# Patient Record
Sex: FEMALE | Race: ASIAN | Employment: FULL TIME | URBAN - METROPOLITAN AREA
[De-identification: names, ages, dates, MRNs, and addresses within clinical notes are randomized per-mention and may not be internally consistent; named-entity substitution may affect disease eponyms.]

---

## 2018-07-30 ENCOUNTER — HOSPITAL ENCOUNTER (OUTPATIENT)
Age: 43
Setting detail: OBSERVATION
Discharge: HOME | End: 2018-07-31
Attending: EMERGENCY MEDICINE | Admitting: SURGERY
Payer: COMMERCIAL

## 2018-07-30 ENCOUNTER — APPOINTMENT (OUTPATIENT)
Dept: CT IMAGING | Age: 43
End: 2018-07-30
Attending: PHYSICIAN ASSISTANT
Payer: COMMERCIAL

## 2018-07-30 ENCOUNTER — APPOINTMENT (OUTPATIENT)
Dept: ULTRASOUND IMAGING | Age: 43
End: 2018-07-30
Attending: PHYSICIAN ASSISTANT
Payer: COMMERCIAL

## 2018-07-30 ENCOUNTER — ANESTHESIA EVENT (OUTPATIENT)
Dept: SURGERY | Age: 43
End: 2018-07-30
Payer: COMMERCIAL

## 2018-07-30 DIAGNOSIS — K80.00 ACUTE CALCULOUS CHOLECYSTITIS: ICD-10-CM

## 2018-07-30 DIAGNOSIS — K80.20 SYMPTOMATIC CHOLELITHIASIS: Primary | ICD-10-CM

## 2018-07-30 DIAGNOSIS — Z90.49 S/P LAPAROSCOPIC CHOLECYSTECTOMY: ICD-10-CM

## 2018-07-30 LAB
ALBUMIN SERPL-MCNC: 3.7 G/DL (ref 3.5–5)
ALBUMIN/GLOB SERPL: 0.9 {RATIO} (ref 1.1–2.2)
ALP SERPL-CCNC: 80 U/L (ref 45–117)
ALT SERPL-CCNC: 102 U/L (ref 12–78)
ANION GAP SERPL CALC-SCNC: 10 MMOL/L (ref 5–15)
APPEARANCE UR: CLEAR
AST SERPL-CCNC: 193 U/L (ref 15–37)
ATRIAL RATE: 61 BPM
BACTERIA URNS QL MICRO: NEGATIVE /HPF
BASOPHILS # BLD: 0.1 K/UL (ref 0–0.1)
BASOPHILS NFR BLD: 1 % (ref 0–1)
BILIRUB SERPL-MCNC: 1.2 MG/DL (ref 0.2–1)
BILIRUB UR QL: NEGATIVE
BUN SERPL-MCNC: 6 MG/DL (ref 6–20)
BUN/CREAT SERPL: 7 (ref 12–20)
CALCIUM SERPL-MCNC: 8.3 MG/DL (ref 8.5–10.1)
CALCULATED P AXIS, ECG09: -13 DEGREES
CALCULATED R AXIS, ECG10: 80 DEGREES
CALCULATED T AXIS, ECG11: 3 DEGREES
CHLORIDE SERPL-SCNC: 102 MMOL/L (ref 97–108)
CO2 SERPL-SCNC: 26 MMOL/L (ref 21–32)
COLOR UR: NORMAL
CREAT SERPL-MCNC: 0.85 MG/DL (ref 0.55–1.02)
D DIMER PPP FEU-MCNC: 0.66 MG/L FEU (ref 0–0.65)
DIAGNOSIS, 93000: NORMAL
DIFFERENTIAL METHOD BLD: ABNORMAL
EOSINOPHIL # BLD: 0.2 K/UL (ref 0–0.4)
EOSINOPHIL NFR BLD: 2 % (ref 0–7)
EPITH CASTS URNS QL MICRO: NORMAL /LPF
ERYTHROCYTE [DISTWIDTH] IN BLOOD BY AUTOMATED COUNT: 15 % (ref 11.5–14.5)
GLOBULIN SER CALC-MCNC: 4.3 G/DL (ref 2–4)
GLUCOSE SERPL-MCNC: 101 MG/DL (ref 65–100)
GLUCOSE UR STRIP.AUTO-MCNC: NEGATIVE MG/DL
HCG UR QL: NEGATIVE
HCT VFR BLD AUTO: 37.6 % (ref 35–47)
HGB BLD-MCNC: 12.8 G/DL (ref 11.5–16)
HGB UR QL STRIP: NEGATIVE
HYALINE CASTS URNS QL MICRO: NORMAL /LPF (ref 0–5)
IMM GRANULOCYTES # BLD: 0 K/UL (ref 0–0.04)
IMM GRANULOCYTES NFR BLD AUTO: 0 % (ref 0–0.5)
KETONES UR QL STRIP.AUTO: NEGATIVE MG/DL
LEUKOCYTE ESTERASE UR QL STRIP.AUTO: NEGATIVE
LIPASE SERPL-CCNC: 181 U/L (ref 73–393)
LYMPHOCYTES # BLD: 2.1 K/UL (ref 0.8–3.5)
LYMPHOCYTES NFR BLD: 20 % (ref 12–49)
MCH RBC QN AUTO: 28.1 PG (ref 26–34)
MCHC RBC AUTO-ENTMCNC: 34 G/DL (ref 30–36.5)
MCV RBC AUTO: 82.6 FL (ref 80–99)
MONOCYTES # BLD: 0.7 K/UL (ref 0–1)
MONOCYTES NFR BLD: 6 % (ref 5–13)
NEUTS SEG # BLD: 7.1 K/UL (ref 1.8–8)
NEUTS SEG NFR BLD: 70 % (ref 32–75)
NITRITE UR QL STRIP.AUTO: NEGATIVE
NRBC # BLD: 0 K/UL (ref 0–0.01)
NRBC BLD-RTO: 0 PER 100 WBC
P-R INTERVAL, ECG05: 134 MS
PH UR STRIP: 6.5 [PH] (ref 5–8)
PLATELET # BLD AUTO: 404 K/UL (ref 150–400)
PMV BLD AUTO: 8.7 FL (ref 8.9–12.9)
POTASSIUM SERPL-SCNC: 4.1 MMOL/L (ref 3.5–5.1)
PROT SERPL-MCNC: 8 G/DL (ref 6.4–8.2)
PROT UR STRIP-MCNC: NEGATIVE MG/DL
Q-T INTERVAL, ECG07: 454 MS
QRS DURATION, ECG06: 86 MS
QTC CALCULATION (BEZET), ECG08: 457 MS
RBC # BLD AUTO: 4.55 M/UL (ref 3.8–5.2)
RBC #/AREA URNS HPF: NORMAL /HPF (ref 0–5)
SODIUM SERPL-SCNC: 138 MMOL/L (ref 136–145)
SP GR UR REFRACTOMETRY: 1.02 (ref 1–1.03)
TROPONIN I SERPL-MCNC: <0.05 NG/ML
UA: UC IF INDICATED,UAUC: NORMAL
UROBILINOGEN UR QL STRIP.AUTO: 0.2 EU/DL (ref 0.2–1)
VENTRICULAR RATE, ECG03: 61 BPM
WBC # BLD AUTO: 10.1 K/UL (ref 3.6–11)
WBC URNS QL MICRO: NORMAL /HPF (ref 0–4)

## 2018-07-30 PROCEDURE — 93005 ELECTROCARDIOGRAM TRACING: CPT

## 2018-07-30 PROCEDURE — 85379 FIBRIN DEGRADATION QUANT: CPT | Performed by: PHYSICIAN ASSISTANT

## 2018-07-30 PROCEDURE — 81001 URINALYSIS AUTO W/SCOPE: CPT | Performed by: EMERGENCY MEDICINE

## 2018-07-30 PROCEDURE — 84484 ASSAY OF TROPONIN QUANT: CPT | Performed by: PHYSICIAN ASSISTANT

## 2018-07-30 PROCEDURE — 74011250636 HC RX REV CODE- 250/636: Performed by: SURGERY

## 2018-07-30 PROCEDURE — 74011636320 HC RX REV CODE- 636/320: Performed by: EMERGENCY MEDICINE

## 2018-07-30 PROCEDURE — 81025 URINE PREGNANCY TEST: CPT

## 2018-07-30 PROCEDURE — 85025 COMPLETE CBC W/AUTO DIFF WBC: CPT | Performed by: EMERGENCY MEDICINE

## 2018-07-30 PROCEDURE — 96375 TX/PRO/DX INJ NEW DRUG ADDON: CPT

## 2018-07-30 PROCEDURE — 96365 THER/PROPH/DIAG IV INF INIT: CPT

## 2018-07-30 PROCEDURE — 74011250636 HC RX REV CODE- 250/636: Performed by: PHYSICIAN ASSISTANT

## 2018-07-30 PROCEDURE — 96374 THER/PROPH/DIAG INJ IV PUSH: CPT

## 2018-07-30 PROCEDURE — 71275 CT ANGIOGRAPHY CHEST: CPT

## 2018-07-30 PROCEDURE — 74011000258 HC RX REV CODE- 258: Performed by: SURGERY

## 2018-07-30 PROCEDURE — 76705 ECHO EXAM OF ABDOMEN: CPT

## 2018-07-30 PROCEDURE — 96366 THER/PROPH/DIAG IV INF ADDON: CPT

## 2018-07-30 PROCEDURE — 36415 COLL VENOUS BLD VENIPUNCTURE: CPT | Performed by: PHYSICIAN ASSISTANT

## 2018-07-30 PROCEDURE — 96361 HYDRATE IV INFUSION ADD-ON: CPT

## 2018-07-30 PROCEDURE — 83690 ASSAY OF LIPASE: CPT | Performed by: EMERGENCY MEDICINE

## 2018-07-30 PROCEDURE — 99285 EMERGENCY DEPT VISIT HI MDM: CPT

## 2018-07-30 PROCEDURE — 99218 HC RM OBSERVATION: CPT

## 2018-07-30 PROCEDURE — 80053 COMPREHEN METABOLIC PANEL: CPT | Performed by: EMERGENCY MEDICINE

## 2018-07-30 RX ORDER — ASCORBIC ACID 500 MG
500 TABLET ORAL 3 TIMES DAILY
COMMUNITY

## 2018-07-30 RX ORDER — ONDANSETRON 2 MG/ML
4 INJECTION INTRAMUSCULAR; INTRAVENOUS
Status: DISCONTINUED | OUTPATIENT
Start: 2018-07-30 | End: 2018-07-31 | Stop reason: HOSPADM

## 2018-07-30 RX ORDER — SODIUM CHLORIDE, SODIUM LACTATE, POTASSIUM CHLORIDE, CALCIUM CHLORIDE 600; 310; 30; 20 MG/100ML; MG/100ML; MG/100ML; MG/100ML
75 INJECTION, SOLUTION INTRAVENOUS CONTINUOUS
Status: DISCONTINUED | OUTPATIENT
Start: 2018-07-30 | End: 2018-07-31 | Stop reason: HOSPADM

## 2018-07-30 RX ORDER — HYDROMORPHONE HYDROCHLORIDE 2 MG/ML
1 INJECTION, SOLUTION INTRAMUSCULAR; INTRAVENOUS; SUBCUTANEOUS
Status: DISCONTINUED | OUTPATIENT
Start: 2018-07-30 | End: 2018-07-31 | Stop reason: HOSPADM

## 2018-07-30 RX ORDER — VITAMIN E 268 MG
400 CAPSULE ORAL 2 TIMES DAILY
COMMUNITY

## 2018-07-30 RX ORDER — LEVOTHYROXINE SODIUM 175 UG/1
175 TABLET ORAL
COMMUNITY

## 2018-07-30 RX ORDER — NALOXONE HYDROCHLORIDE 0.4 MG/ML
0.4 INJECTION, SOLUTION INTRAMUSCULAR; INTRAVENOUS; SUBCUTANEOUS AS NEEDED
Status: DISCONTINUED | OUTPATIENT
Start: 2018-07-30 | End: 2018-07-31 | Stop reason: HOSPADM

## 2018-07-30 RX ORDER — CALCIUM CARBONATE 500(1250)
1 TABLET ORAL 3 TIMES DAILY
COMMUNITY
End: 2021-06-25

## 2018-07-30 RX ORDER — SODIUM CHLORIDE 0.9 % (FLUSH) 0.9 %
5-10 SYRINGE (ML) INJECTION AS NEEDED
Status: DISCONTINUED | OUTPATIENT
Start: 2018-07-30 | End: 2018-07-31 | Stop reason: HOSPADM

## 2018-07-30 RX ORDER — SODIUM CHLORIDE 0.9 % (FLUSH) 0.9 %
5-10 SYRINGE (ML) INJECTION EVERY 8 HOURS
Status: DISCONTINUED | OUTPATIENT
Start: 2018-07-30 | End: 2018-07-31 | Stop reason: HOSPADM

## 2018-07-30 RX ORDER — FERROUS SULFATE 325(65) MG
325 TABLET, DELAYED RELEASE (ENTERIC COATED) ORAL
COMMUNITY

## 2018-07-30 RX ORDER — CHOLECALCIFEROL (VITAMIN D3) 125 MCG
100 CAPSULE ORAL DAILY
COMMUNITY

## 2018-07-30 RX ORDER — KETOROLAC TROMETHAMINE 30 MG/ML
30 INJECTION, SOLUTION INTRAMUSCULAR; INTRAVENOUS
Status: COMPLETED | OUTPATIENT
Start: 2018-07-30 | End: 2018-07-30

## 2018-07-30 RX ORDER — ACETAMINOPHEN 325 MG/1
650 TABLET ORAL
Status: DISCONTINUED | OUTPATIENT
Start: 2018-07-30 | End: 2018-07-31 | Stop reason: HOSPADM

## 2018-07-30 RX ORDER — OXYCODONE HYDROCHLORIDE 5 MG/1
5 TABLET ORAL
Status: DISCONTINUED | OUTPATIENT
Start: 2018-07-30 | End: 2018-07-31 | Stop reason: HOSPADM

## 2018-07-30 RX ORDER — MELATONIN
1000 DAILY
COMMUNITY

## 2018-07-30 RX ADMIN — PIPERACILLIN SODIUM AND TAZOBACTAM SODIUM 3.38 G: 3; .375 INJECTION, POWDER, LYOPHILIZED, FOR SOLUTION INTRAVENOUS at 20:09

## 2018-07-30 RX ADMIN — Medication 10 ML: at 14:55

## 2018-07-30 RX ADMIN — SODIUM CHLORIDE, SODIUM LACTATE, POTASSIUM CHLORIDE, AND CALCIUM CHLORIDE 100 ML/HR: 600; 310; 30; 20 INJECTION, SOLUTION INTRAVENOUS at 20:09

## 2018-07-30 RX ADMIN — KETOROLAC TROMETHAMINE 30 MG: 30 INJECTION, SOLUTION INTRAMUSCULAR at 14:55

## 2018-07-30 RX ADMIN — IOPAMIDOL 80 ML: 755 INJECTION, SOLUTION INTRAVENOUS at 15:31

## 2018-07-30 NOTE — ED TRIAGE NOTES
Pt with h/o gall stones and n/d, pt with right sided back pain and radiating to anterior abd. Pt took nothing for symptoms PTA.

## 2018-07-30 NOTE — IP AVS SNAPSHOT
[address] 
[phone] Patient: Rey Jackson 
MRN: [id number] MMD:7/59/2709 About your hospitalization You were admitted on:  July 30, 2018 You last received care in the:  [facility] 4[facility] POST SURG ORT 2 You were discharged on:  July 31, 2018 Why you were hospitalized Your primary diagnosis was:  Not on File Your diagnoses also included:  Acute Calculous Cholecystitis Follow-up Information Follow up With Details Comments Contact Info Kishan Laguna MD  Practice will call the patient to schedule follow-up appointment  [address] [place] [address] 
[phone] Discharge Orders None A check konstantin indicates which time of day the medication should be taken. My Medications START taking these medications Instructions Each Dose to Equal  
 Morning Noon Evening Bedtime  
 oxyCODONE IR 5 mg immediate release tablet Commonly known as:  Charolet Music Your last dose was:  5 mg on 7/31/2018  5:39 PM  
   
 Take 1 Tab by mouth every four (4) hours as needed. Max Daily Amount: 30 mg.  
 5 mg CONTINUE taking these medications Instructions Each Dose to Equal  
 Morning Noon Evening Bedtime  
 calcium carbonate 500 mg calcium (1,250 mg) tablet Commonly known as:  OS-AARTI Take 1 Tab by mouth three (3) times daily. 1 Tab  
    
   
   
   
  
 CO Q-10 100 mg capsule Generic drug:  co-enzyme Q-10 Take 100 mg by mouth daily. 100 mg  
    
   
   
   
  
 ferrous sulfate 325 mg (65 mg iron) EC tablet Commonly known as:  IRON Take 325 mg by mouth Daily (before breakfast). 325 mg  
    
   
   
   
  
 levothyroxine 175 mcg tablet Commonly known as:  SYNTHROID Your last dose was:  175 mcg on 7/31/2018  6:47 AM  
   
 Take 175 mcg by mouth Daily (before breakfast). 175 mcg OMEGA 3-6-9 1,200 mg Cap Generic drug:  fish,bora,flax oils-om3,6,9no1 Take 1 Cap by mouth three (3) times daily. 1 Cap VITAMIN C 500 mg tablet Generic drug:  ascorbic acid (vitamin C) Take 500 mg by mouth three (3) times daily. 500 mg  
    
   
   
   
  
 VITAMIN D3 1,000 unit tablet Generic drug:  cholecalciferol Take 1,000 Units by mouth daily. 1000 Units  
    
   
   
   
  
 vitamin E 400 unit capsule Commonly known as:  [address] Take 400 Units by mouth two (2) times a day. 400 Units Where to Get Your Medications Information on where to get these meds will be given to you by the nurse or doctor. ! Ask your nurse or doctor about these medications  
  oxyCODONE IR 5 mg immediate release tablet Opioid Education Prescription Opioids: What You Need to Know: 
 
Prescription opioids can be used to help relieve moderate-to-severe pain and are often prescribed following a surgery or injury, or for certain health conditions. These medications can be an important part of treatment but also come with serious risks. Opioids are strong pain medicines. Examples include hydrocodone, oxycodone, fentanyl, and morphine. Heroin is an example of an illegal opioid. It is important to work with your health care provider to make sure you are getting the safest, most effective care. WHAT ARE THE RISKS AND SIDE EFFECTS OF OPIOID USE? Prescription opioids carry serious risks of addiction and overdose, especially with prolonged use. An opioid overdose, often marked by slow breathing, can cause sudden death. The use of prescription opioids can have a number of side effects as well, even when taken as directed. · Tolerance-meaning you might need to take more of a medication for the same pain relief · Physical dependence-meaning you have symptoms of withdrawal when the medication is stopped. Withdrawal symptoms can include nausea, sweating, chills, diarrhea, stomach cramps, and muscle aches. Withdrawal can last up to several weeks, depending on which drug you took and how long you took it. · Increased sensitivity to pain · Constipation · Nausea, vomiting, and dry mouth · Sleepiness and dizziness · Confusion · Depression · Low levels of testosterone that can result in lower sex drive, energy, and strength · Itching and sweating RISKS ARE GREATER WITH:      
· History of drug misuse, substance use disorder, or overdose · Mental health conditions (such as depression or anxiety) · Sleep apnea · Older age (72 years or older) · Pregnancy Avoid alcohol while taking prescription opioids. Also, unless specifically advised by your health care provider, medications to avoid include: · Benzodiazepines (such as Xanax or Valium) · Muscle relaxants (such as Soma or Flexeril) · Hypnotics (such as Ambien or Lunesta) · Other prescription opioids KNOW YOUR OPTIONS Talk to your health care provider about ways to manage your pain that don't involve prescription opioids. Some of these options may actually work better and have fewer risks and side effects. Options may include: 
· Pain relievers such as acetaminophen, ibuprofen, and naproxen · Some medications that are also used for depression or seizures · Physical therapy and exercise · Counseling to help patients learn how to cope better with triggers of pain and stress. · Application of heat or cold compress · Massage therapy · Relaxation techniques Be Informed Make sure you know the name of your medication, how much and how often to take it, and its potential risks & side effects. IF YOU ARE PRESCRIBED OPIOIDS FOR PAIN: 
· Never take opioids in greater amounts or more often than prescribed. Remember the goal is not to be pain-free but to manage your pain at a tolerable level. · Follow up with your primary care provider to: · Work together to create a plan on how to manage your pain. · Talk about ways to help manage your pain that don't involve prescription opioids. · Talk about any and all concerns and side effects. · Help prevent misuse and abuse. · Never sell or share prescription opioids · Help prevent misuse and abuse. · Store prescription opioids in a secure place and out of reach of others (this may include visitors, children, friends, and family). · Safely dispose of unused/unwanted prescription opioids: Find your community drug take-back program or your pharmacy mail-back program, or flush them down the toilet, following guidance from the Food and Drug Administration (www.fda.gov/Drugs/ResourcesForYou). · Visit www.cdc.gov/drugoverdose to learn about the risks of opioid abuse and overdose. · If you believe you may be struggling with addiction, tell your health care provider and ask for guidance or call [employer] at 0-595-367-IFPF. Discharge Instructions Cholecystectomy: What to Expect at [facility] Your Recovery After your surgery, it is normal to feel weak and tired for several days after you return home. Your belly may be swollen. If you had laparoscopic surgery, you may also have pain in your shoulder for about 24 hours. You may have gas or need to burp a lot at first, and a few people get diarrhea. The diarrhea usually goes away in 2 to 4 weeks, but it may last longer. How quickly you recover depends on whether you had a laparoscopic or open surgery. · For a laparoscopic surgery, most people can go back to work or their normal routine in 1 to 2 weeks, but it may take longer, depending on the type of work you do. · For an open surgery, it will probably take 4 to 6 weeks before you get back to your normal routine. This care sheet gives you a general idea about how long it will take for you to recover; however, each person recovers at a different pace. Follow the steps below to get better as quickly as possible. How can you care for yourself at home? Activity 
  · Rest when you feel tired. Getting enough sleep will help you recover.  
  · Try to walk each day. Start out by walking a little more than you did the day before. Gradually increase the amount you walk. Walking boosts blood flow and helps prevent pneumonia and constipation.  
  · For about 2 weeks, avoid lifting anything that would make you strain. This may include a child, heavy grocery bags and milk containers, a heavy briefcase or backpack, cat litter or dog food bags, or a vacuum .  
  · Avoid strenuous activities, such as biking, jogging, weightlifting, and aerobic exercise, until your doctor says it is okay.  
  · You may shower 24 hours after surgery, if your doctor okays it. Pat the cuts (incisions) dry. Do not take a bath for the first 2 weeks, and until after seen by your doctor.  
  · You may drive after 2 days and when you are no longer taking narcotic pain medicine and can quickly move your foot from the gas pedal to the brake. You must also be able to sit comfortably for a long period of time, even if you do not plan to go far because you might get caught in traffic.  
  · For a laparoscopic surgery, most people can go back to work or their normal routine in 1 to 2 weeks, but it may take longer. For an open surgery, it will probably take 4 to 6 weeks before you get back to your normal routine.  
  ·   
 Diet 
  · Eat smaller meals more often instead of fewer larger meals. You can eat a normal diet. If your stomach is upset, try bland, low-fat foods like plain rice, broiled chicken, toast, and yogurt, and avoid eating fatty foods for about 1 month. Fatty foods include hamburger, whole milk, cheese, and many snack foods.   · Drink plenty of fluids (unless your doctor tells you not to).   · If you have diarrhea, try avoiding spicy foods, dairy products, fatty foods, and alcohol. You can also watch to see if specific foods cause it, and stop eating them. If the diarrhea continues for more than 2 weeks, talk to your doctor.  
  · You may notice that your bowel movements are not regular right after your surgery. This is common. Try to avoid constipation and straining with bowel movements. You may want to take a fiber supplement every day. If you have not had a bowel movement after a couple of days, ask your doctor about taking a mild laxative. Medicines 
  · You can restart your medicines. Your doctor will also give you instructions about taking any new medicines.  
  · If you take blood thinners, such as warfarin (Coumadin), be sure to talk to your doctor. Your doctor will tell you if and when to start taking those medicines again. Make sure that you understand exactly what your doctor wants you to do.  
  · Take pain medicines exactly as directed. ¨ If the doctor gave you a prescription medicine for pain, take it as prescribed. ¨ If you are not taking a prescription pain medicine, take an over-the-counter medicine such as acetaminophen (Tylenol), ibuprofen (Advil, Motrin), or naproxen (Aleve). Read and follow all instructions on the label. ¨ Do not take two or more pain medicines at the same time unless the doctor told you to. Many pain medicines contain acetaminophen, which is Tylenol. Too much Tylenol can be harmful.  
  · If you think your pain medicine is making you sick to your stomach: 
¨ Take your medicine after meals (unless your doctor tells you not to). ¨ Ask your doctor for a different pain medicine.  
  · If your doctor prescribed antibiotics, take them as directed. Do not stop taking them just because you feel better. You need to take the full course of antibiotics. Incision care   · Remove your bandages on Thursday, 8/2. You may leave your incisions uncovered. · If you have strips of tape on the incision, or cut, leave the tape on for a week or until it falls off.  
  · After 24 to 48 hours, wash the area daily with warm, soapy water, and pat it dry.  
  ·   · Keep the area clean and dry. You may cover it with a gauze bandage if it weeps or rubs against clothing. Change the bandage every day.  
 Ice 
  · To reduce swelling and pain, put ice or a cold pack on your belly for 10 to 20 minutes at a time. Do this every 1 to 2 hours. Put a thin cloth between the ice and your skin. Follow-up care is a key part of your treatment and safety. Be sure to make and go to all appointments, and call your doctor if you are having problems. It's also a good idea to know your test results and keep a list of the medicines you take. When should you call for help? Call 911 anytime you think you may need emergency care. For example, call if: 
  · You passed out (lost consciousness).  
  · You are short of breath. Vallorie Quintin your doctor now or seek immediate medical care if: 
  · You are sick to your stomach and cannot drink fluids.  
  · You have abdominal pain that does not get better when you take your pain medicine. · Your eyes turn jaundice (yellow).  
  · You cannot pass stool or gas.  
  · You have signs of infection, such as: 
¨ Increased pain, swelling, warmth, or redness. ¨ Red streaks leading from the incision. ¨ Pus draining from the incision. ¨ A fever > 101.  
  · Bright red blood has soaked through the bandage over your incision.  
  · You have loose stitches, or your incision comes open.  
  · You have signs of a blood clot in your leg (called a deep vein thrombosis), such as: 
¨ Pain in your calf, back of knee, thigh, or groin.  
¨ Redness and swelling in your leg or groin.  
 Watch closely for any changes in your health, and be sure to contact your doctor if you have any problems. Where can you learn more? Go to http://miguel-martha.info/. Enter 202 74 [id number] in the search box to learn more about \"Cholecystectomy: What to Expect at Home. \" Current as of: May 12, 2017 Content Version: 11.7 © 1881-4934 [employer]. Care instructions adapted under license by [employer] (which disclaims liability or warranty for this information). If you have questions about a medical condition or this instruction, always ask your healthcare professional. [place] [address] any warranty or liability for your use of this information. Gabino Peters. Lior Vallejo MD, FACS General Surgery at [address] [place] [address], [facility] [place] [address] 
[phone] Fax [phone] Introducing [facility] [place] [facility]! [facility] introduces [employer] patient portal. Now you can access parts of your medical record, email your doctor's office, and request medication refills online. 1. In your internet browser, go to [email or web address] [email or web address] Nation 2. Click on the First Time User? Click Here link in the Sign In box. You will see the New Member Sign Up page. 3. Enter your Brickstream Access Code exactly as it appears below. You will not need to use this code after youve completed the sign-up process. If you do not sign up before the expiration date, you must request a new code. · B[employer]stream Access Code: [email or web address] Expires: 10/28/2018 12:11 PM 
 
4. Enter the last four digits of your Social Security Number (xxxx) and Date of Birth (mm/dd/yyyy) as indicated and click Submit. You will be taken to the next sign-up page. 5. Create a Sempriust ID. This will be your Brickstream login ID and cannot be changed, so think of one that is secure and easy to remember. 6. Create a Sem[employer]ust password. You can change your password at any time. 7. Enter your Password Reset Question and Answer. This can be used at a later time if you forget your password. 8. Enter your e-mail address. You will receive e-mail notification when new information is available in [address] 9. Click Sign Up. You can now view and download portions of your medical record. 10. Click the Download Summary menu link to download a portable copy of your medical information. If you have questions, please visit the Frequently Asked Questions section of the [employer] website. Remember, [employer]Layers is NOT to be used for urgent needs. For medical emergencies, dial 911. Now available from your iPhone and Android! Introducing Xavier Vivar As a [employer] patient, I wanted to make you aware of our electronic visit tool called Xavier Vivar. [employer] 24/7 allows you to connect within minutes with a medical provider 24 hours a day, seven days a week via a mobile device or tablet or logging into a secure website from your computer. You can access Xavier Vivar from anywhere in the [place]. A virtual visit might be right for you when you have a simple condition and feel like you just dont want to get out of bed, or cant get away from work for an appointment, when your regular [employer] provider is not available (evenings, weekends or holidays), or when youre out of town and need minor care. Electronic visits cost only $49 and if the [employer] 24/7 provider determines a prescription is needed to treat your condition, one can be electronically transmitted to a nearby pharmacy*. Please take a moment to enroll today if you have not already done so. The enrollment process is free and takes just a few minutes. To enroll, please download the [employer] 24/7 viv to your tablet or phone, or visit [email or web address] to enroll on your computer.    
And, as an [address] [place] [address] patient with a [employer] account, the results of your visits will be scanned into your electronic medical record and your primary care provider will be able to view the scanned results. We urge you to continue to see your regular Mykel Carbo provider for your ongoing medical care. And while your primary care provider may not be the one available when you seek a Xavier Lopezfin virtual visit, the peace of mind you get from getting a real diagnosis real time can be priceless. For more information on Xavier Lindsey Shellisaurafin, view our Frequently Asked Questions (FAQs) at [email or web address]. Sincerely, 
 
Shanti Keller MD 
Chief Medical Officer Petra Oakes *:  certain medications cannot be prescribed via COADEisaurafin Providers Seen During Your Hospitalization Provider Specialty Primary office phone Ellison Najjar, MD Emergency Medicine [phone] Audrey Zelaya MD Surgery [phone] Your Primary Care Physician (PCP) Primary Care Physician Office Phone Office Fax Gonzalez Miki, [address] [facility] [phone] You are allergic to the following No active allergies Recent Documentation Height Weight Breastfeeding? BMI OB Status Smoking Status 1.499 m 88.9 kg No 39.58 kg/m2 Having regular periods Never Smoker Emergency Contacts Name Discharge Info Relation Home Work Mobile Ángel Emery  Spouse [3] Patient Belongings The following personal items are in your possession at time of discharge: 
  Dental Appliances: None  Visual Aid: Glasses, With patient      Home Medications: None   Jewelry: None  Clothing: None (street clothes to preop locker)    Other Valuables: None Please provide this summary of care documentation to your next provider. Signatures-by signing, you are acknowledging that this After Visit Summary has been reviewed with you and you have received a copy.   
  
 
  
    
    
 Patient Signature: ____________________________________________________________ Date:  ____________________________________________________________  
  
Edwin Naas Provider Signature:  ____________________________________________________________ Date:  ____________________________________________________________

## 2018-07-30 NOTE — PROGRESS NOTES
[facility]: MED RECONCILIATION Information obtained from: patient, Rx query Significant PMH/Disease States: History reviewed. No pertinent past medical history. Chief Complaint for this Admission: Chief Complaint Patient presents with  Abdominal Pain Allergies: Review of patient's allergies indicates no known allergies. Prior to Admission Medications:  
 
Medication Documentation Review Audit Reviewed by Terrall Mcardle, PHARMD (Pharmacist) on 07/30/18 at 2820 Medication Sig Documenting Provider Last Dose Status Taking?  
 
 ascorbic acid, vitamin C, (VITAMIN C) 500 mg tablet Take 500 mg by mouth three (3) times daily. Historical Provider 7/30/2018 am Active Yes  
 calcium carbonate (OS-AARTI) 500 mg calcium (1,250 mg) tablet Take 1 Tab by mouth three (3) times daily. Historical Provider 7/30/2018 am Active Yes  
 cholecalciferol (VITAMIN D3) 1,000 unit tablet Take 1,000 Units by mouth daily. Historical Provider 7/30/2018 am Active Yes  
 co-enzyme Q-10 (CO Q-10) 100 mg capsule Take 100 mg by mouth daily. Historical Provider 7/30/2018 am Active Yes  
 ferrous sulfate (IRON) 325 mg (65 mg iron) EC tablet Take 325 mg by mouth Daily (before breakfast). Historical Provider 7/30/2018 am Active Yes  
 fish,bora,flax oils-om3,6,9no1 (OMEGA 3-6-9) 1,200 mg cap Take 1 Cap by mouth three (3) times daily. Historical Provider 7/30/2018 am Active Yes  
 levothyroxine (SYNTHROID) 175 mcg tablet Take 175 mcg by mouth Daily (before breakfast). Historical Provider 7/30/2018 am Active Yes  
 vitamin E (AQUA GEMS) 400 unit capsule Take 400 Units by mouth two (2) times a day. Historical Provider 7/30/2018 am Active Yes Thank you for the consult, 
Yobani Griffiths D, [address]

## 2018-07-30 NOTE — ED PROVIDER NOTES
HPI Comments: This is a 36 yo [place] female with medical hx remarkable for thyroid cancer s/p thyroid resection, and hypocalcemia  Presenting ambulatory to the ED with complaint of rt sided back pain radiating upward under the scapula and around to the RUQ of abdomen after drinking coffee and eating cherries this AM, exacerbated with breathing. Known hx of gallstones. Similar pain twice in past two weeks. Mild nausea and diarrhea today. Not atypical per patient with menstrual cycle, which ended, two days ago. No fever, chills, diaphoresis, headache, constipation, dysuria, urinary urgency or frequency. Patient is a 37 y.o. female presenting with abdominal pain. The history is provided by the patient. Abdominal Pain Associated symptoms include arthralgias (rt scapula), chest pain and back pain. Pertinent negatives include no fever, no diarrhea, no nausea, no vomiting, no constipation, no frequency and no headaches. No past medical history on file. No past surgical history on file. No family history on file. Social History Social History  Marital status:  Spouse name: N/A  
 Number of children: N/A  
 Years of education: N/A Occupational History  Not on file. Social History Main Topics  Smoking status: Not on file  Smokeless tobacco: Not on file  Alcohol use Not on file  Drug use: Not on file  Sexual activity: Not on file Other Topics Concern  Not on file Social History Narrative ALLERGIES: Review of patient's allergies indicates no known allergies. Review of Systems Constitutional: Negative. Negative for chills, fatigue and fever. HENT: Negative. Negative for congestion, ear pain, facial swelling, rhinorrhea, sneezing and sore throat. Respiratory: Negative for cough and shortness of breath. Cardiovascular: Positive for chest pain. Negative for leg swelling. Gastrointestinal: Positive for abdominal pain (RUQ). Negative for abdominal distention, constipation, diarrhea, nausea and vomiting. Genitourinary: Negative for difficulty urinating, frequency and urgency. Musculoskeletal: Positive for arthralgias (rt scapula) and back pain. Skin: Negative for rash. Neurological: Positive for dizziness. Negative for headaches. All other systems reviewed and are negative. Vitals:  
 07/30/18 1211 BP: (!) 231/109 Pulse: 83 Resp: 16 Temp: 98.9 °F (37.2 °C) SpO2: 100% Weight: 90.3 kg (199 lb) Height: 4' 11\" (1.499 m) Physical Exam  
Constitutional: She is oriented to person, place, and time. She appears well-developed and well-nourished. No distress.  female seated in NAD HENT:  
Head: Normocephalic and atraumatic. Right Ear: External ear normal.  
Left Ear: External ear normal.  
Nose: Nose normal.  
Mouth/Throat: Oropharynx is clear and moist. No oropharyngeal exudate. Eyes: Conjunctivae and EOM are normal. Pupils are equal, round, and reactive to light. Right eye exhibits no discharge. Left eye exhibits no discharge. No scleral icterus. Neck: Normal range of motion. Neck supple. Cardiovascular: Normal rate and regular rhythm. Exam reveals no gallop and no friction rub. No murmur heard. Pulmonary/Chest: Effort normal and breath sounds normal. She has no wheezes. She has no rales. Abdominal: Soft. Bowel sounds are normal. She exhibits no distension. There is tenderness (RUQ). There is no rebound and no guarding. Neurological: She is alert and oriented to person, place, and time. No cranial nerve deficit. Coordination normal.  
Skin: Skin is warm and dry. She is not diaphoretic. Psychiatric: She has a normal mood and affect. Her behavior is normal.  
Nursing note and vitals reviewed. MDM Number of Diagnoses or Management Options Diagnosis management comments: 38 yo  female with complaint of rt sided back pain radiating into the abdomen with hx of similar and known hx of gallstones. Concern for biliary colic vs pancreatitis vs ACS vs PE vs PNA amongst others Plan EKG Trop CBC 
CMP Lipase UA Analgesia 
 reassess. Maribell Epstein Amount and/or Complexity of Data Reviewed Clinical lab tests: ordered and reviewed Tests in the radiology section of CPT®: ordered and reviewed Independent visualization of images, tracings, or specimens: yes ED Course Procedures Progress note EKG interpretation:  
Rhythm: normal sinus rhythm; and regular . Rate (approx.): 61; Axis: normal; P wave: normal; QRS interval: normal ; ST/T wave: normal; in  Leads. Maribell Epstein Labs and imaging reviewed. Gall stone noted with gallbladder wall thickening. Also transminase and bili elevated. Maribell Epstein Awaiting gen surgery consult. Pt sign out to LORENZO Cruz at end of shift. Maribell Epstein General surgery to admit. Dr. Tina Yanes came to see the patient around 1900. ASM.

## 2018-07-31 ENCOUNTER — APPOINTMENT (OUTPATIENT)
Dept: GENERAL RADIOLOGY | Age: 43
End: 2018-07-31
Attending: SURGERY
Payer: COMMERCIAL

## 2018-07-31 ENCOUNTER — ANESTHESIA (OUTPATIENT)
Dept: SURGERY | Age: 43
End: 2018-07-31
Payer: COMMERCIAL

## 2018-07-31 VITALS
OXYGEN SATURATION: 97 % | BODY MASS INDEX: 39.51 KG/M2 | DIASTOLIC BLOOD PRESSURE: 83 MMHG | WEIGHT: 195.99 LBS | HEART RATE: 58 BPM | TEMPERATURE: 98.2 F | HEIGHT: 59 IN | RESPIRATION RATE: 16 BRPM | SYSTOLIC BLOOD PRESSURE: 139 MMHG

## 2018-07-31 PROBLEM — E66.01 MORBID OBESITY WITH BMI OF 40.0-44.9, ADULT (HCC): Status: ACTIVE | Noted: 2018-07-31

## 2018-07-31 LAB
ALBUMIN SERPL-MCNC: 3.1 G/DL (ref 3.5–5)
ALBUMIN/GLOB SERPL: 0.8 {RATIO} (ref 1.1–2.2)
ALP SERPL-CCNC: 65 U/L (ref 45–117)
ALT SERPL-CCNC: 86 U/L (ref 12–78)
ANION GAP SERPL CALC-SCNC: 8 MMOL/L (ref 5–15)
AST SERPL-CCNC: 66 U/L (ref 15–37)
BILIRUB SERPL-MCNC: 0.8 MG/DL (ref 0.2–1)
BUN SERPL-MCNC: 6 MG/DL (ref 6–20)
BUN/CREAT SERPL: 6 (ref 12–20)
CALCIUM SERPL-MCNC: 7.8 MG/DL (ref 8.5–10.1)
CHLORIDE SERPL-SCNC: 105 MMOL/L (ref 97–108)
CO2 SERPL-SCNC: 27 MMOL/L (ref 21–32)
CREAT SERPL-MCNC: 0.98 MG/DL (ref 0.55–1.02)
GLOBULIN SER CALC-MCNC: 3.7 G/DL (ref 2–4)
GLUCOSE SERPL-MCNC: 99 MG/DL (ref 65–100)
POTASSIUM SERPL-SCNC: 3.7 MMOL/L (ref 3.5–5.1)
PROT SERPL-MCNC: 6.8 G/DL (ref 6.4–8.2)
SODIUM SERPL-SCNC: 140 MMOL/L (ref 136–145)

## 2018-07-31 PROCEDURE — 74011250636 HC RX REV CODE- 250/636: Performed by: ANESTHESIOLOGY

## 2018-07-31 PROCEDURE — 77030002933 HC SUT MCRYL J&J -A: Performed by: SURGERY

## 2018-07-31 PROCEDURE — 76000 FLUOROSCOPY <1 HR PHYS/QHP: CPT

## 2018-07-31 PROCEDURE — 74011000250 HC RX REV CODE- 250: Performed by: SURGERY

## 2018-07-31 PROCEDURE — 99218 HC RM OBSERVATION: CPT

## 2018-07-31 PROCEDURE — 77030020782 HC GWN BAIR PAWS FLX 3M -B

## 2018-07-31 PROCEDURE — 74011000250 HC RX REV CODE- 250

## 2018-07-31 PROCEDURE — 76210000016 HC OR PH I REC 1 TO 1.5 HR: Performed by: SURGERY

## 2018-07-31 PROCEDURE — 77030008756 HC TU IRR SUC STRY -B: Performed by: SURGERY

## 2018-07-31 PROCEDURE — 74011250636 HC RX REV CODE- 250/636: Performed by: SURGERY

## 2018-07-31 PROCEDURE — 80053 COMPREHEN METABOLIC PANEL: CPT | Performed by: SURGERY

## 2018-07-31 PROCEDURE — 74011000258 HC RX REV CODE- 258: Performed by: SURGERY

## 2018-07-31 PROCEDURE — 74011250636 HC RX REV CODE- 250/636

## 2018-07-31 PROCEDURE — 77030010935 HC CLP LIG ABSRB TELE -B: Performed by: SURGERY

## 2018-07-31 PROCEDURE — 74011636320 HC RX REV CODE- 636/320: Performed by: SURGERY

## 2018-07-31 PROCEDURE — 77030004813 HC CATH CHOLGM TELE -B: Performed by: SURGERY

## 2018-07-31 PROCEDURE — 77030020053 HC ELECTRD LAPSCP COVD -B: Performed by: SURGERY

## 2018-07-31 PROCEDURE — 96366 THER/PROPH/DIAG IV INF ADDON: CPT

## 2018-07-31 PROCEDURE — 77030026438 HC STYL ET INTUB CARD -A: Performed by: ANESTHESIOLOGY

## 2018-07-31 PROCEDURE — 77030009852 HC PCH RTVR ENDOSC COVD -B: Performed by: SURGERY

## 2018-07-31 PROCEDURE — 77030032490 HC SLV COMPR SCD KNE COVD -B: Performed by: SURGERY

## 2018-07-31 PROCEDURE — 77030019908 HC STETH ESOPH SIMS -A: Performed by: ANESTHESIOLOGY

## 2018-07-31 PROCEDURE — 77030037032 HC INSRT SCIS CLICKLLINE DISP STOR -B: Performed by: SURGERY

## 2018-07-31 PROCEDURE — 74011250637 HC RX REV CODE- 250/637: Performed by: ANESTHESIOLOGY

## 2018-07-31 PROCEDURE — 77030035045 HC TRCR ENDOSC VRSPRT BLDLSS COVD -B: Performed by: SURGERY

## 2018-07-31 PROCEDURE — 77030008684 HC TU ET CUF COVD -B: Performed by: ANESTHESIOLOGY

## 2018-07-31 PROCEDURE — 77030035047 HC TRCR ENDOSC VRSPRT BLDLSS LNG COVD -C: Performed by: SURGERY

## 2018-07-31 PROCEDURE — 77030011640 HC PAD GRND REM COVD -A: Performed by: SURGERY

## 2018-07-31 PROCEDURE — 88304 TISSUE EXAM BY PATHOLOGIST: CPT | Performed by: SURGERY

## 2018-07-31 PROCEDURE — 77030035051: Performed by: SURGERY

## 2018-07-31 PROCEDURE — 77030020263 HC SOL INJ SOD CL0.9% LFCR 1000ML: Performed by: SURGERY

## 2018-07-31 PROCEDURE — 77030008771 HC TU NG SALEM SUMP -A: Performed by: ANESTHESIOLOGY

## 2018-07-31 PROCEDURE — 74011250637 HC RX REV CODE- 250/637: Performed by: SURGERY

## 2018-07-31 PROCEDURE — 77030025088 HC APPL CLP LIG 1 COVD -B: Performed by: SURGERY

## 2018-07-31 PROCEDURE — 77030010939 HC CLP LIG TELE -B: Performed by: SURGERY

## 2018-07-31 PROCEDURE — 77030035048 HC TRCR ENDOSC OPTCL COVD -B: Performed by: SURGERY

## 2018-07-31 PROCEDURE — 77030009848 HC PASSR SUT SET COOP -C: Performed by: SURGERY

## 2018-07-31 PROCEDURE — 77030018836 HC SOL IRR NACL ICUM -A: Performed by: SURGERY

## 2018-07-31 PROCEDURE — 36415 COLL VENOUS BLD VENIPUNCTURE: CPT | Performed by: SURGERY

## 2018-07-31 PROCEDURE — 74011000258 HC RX REV CODE- 258

## 2018-07-31 PROCEDURE — 76060000034 HC ANESTHESIA 1.5 TO 2 HR: Performed by: SURGERY

## 2018-07-31 PROCEDURE — 96361 HYDRATE IV INFUSION ADD-ON: CPT

## 2018-07-31 PROCEDURE — 77030020747 HC TU INSUF ENDOSC TELE -A: Performed by: SURGERY

## 2018-07-31 PROCEDURE — 76010000153 HC OR TIME 1.5 TO 2 HR: Performed by: SURGERY

## 2018-07-31 RX ORDER — ROCURONIUM BROMIDE 10 MG/ML
INJECTION, SOLUTION INTRAVENOUS AS NEEDED
Status: DISCONTINUED | OUTPATIENT
Start: 2018-07-31 | End: 2018-07-31 | Stop reason: HOSPADM

## 2018-07-31 RX ORDER — LIDOCAINE HYDROCHLORIDE 10 MG/ML
0.1 INJECTION, SOLUTION EPIDURAL; INFILTRATION; INTRACAUDAL; PERINEURAL AS NEEDED
Status: DISCONTINUED | OUTPATIENT
Start: 2018-07-31 | End: 2018-07-31 | Stop reason: HOSPADM

## 2018-07-31 RX ORDER — SODIUM CHLORIDE 0.9 % (FLUSH) 0.9 %
5-10 SYRINGE (ML) INJECTION AS NEEDED
Status: DISCONTINUED | OUTPATIENT
Start: 2018-07-31 | End: 2018-07-31 | Stop reason: HOSPADM

## 2018-07-31 RX ORDER — PROPOFOL 10 MG/ML
INJECTION, EMULSION INTRAVENOUS AS NEEDED
Status: DISCONTINUED | OUTPATIENT
Start: 2018-07-31 | End: 2018-07-31 | Stop reason: HOSPADM

## 2018-07-31 RX ORDER — ONDANSETRON 2 MG/ML
INJECTION INTRAMUSCULAR; INTRAVENOUS AS NEEDED
Status: DISCONTINUED | OUTPATIENT
Start: 2018-07-31 | End: 2018-07-31 | Stop reason: HOSPADM

## 2018-07-31 RX ORDER — BUPIVACAINE HYDROCHLORIDE 5 MG/ML
INJECTION, SOLUTION EPIDURAL; INTRACAUDAL AS NEEDED
Status: DISCONTINUED | OUTPATIENT
Start: 2018-07-31 | End: 2018-07-31 | Stop reason: HOSPADM

## 2018-07-31 RX ORDER — SODIUM CHLORIDE 0.9 % (FLUSH) 0.9 %
5-10 SYRINGE (ML) INJECTION EVERY 8 HOURS
Status: DISCONTINUED | OUTPATIENT
Start: 2018-07-31 | End: 2018-07-31 | Stop reason: HOSPADM

## 2018-07-31 RX ORDER — SUCCINYLCHOLINE CHLORIDE 20 MG/ML
INJECTION INTRAMUSCULAR; INTRAVENOUS AS NEEDED
Status: DISCONTINUED | OUTPATIENT
Start: 2018-07-31 | End: 2018-07-31 | Stop reason: HOSPADM

## 2018-07-31 RX ORDER — HYDROMORPHONE HYDROCHLORIDE 1 MG/ML
.25-1 INJECTION, SOLUTION INTRAMUSCULAR; INTRAVENOUS; SUBCUTANEOUS
Status: DISCONTINUED | OUTPATIENT
Start: 2018-07-31 | End: 2018-07-31 | Stop reason: HOSPADM

## 2018-07-31 RX ORDER — SODIUM CHLORIDE, SODIUM LACTATE, POTASSIUM CHLORIDE, CALCIUM CHLORIDE 600; 310; 30; 20 MG/100ML; MG/100ML; MG/100ML; MG/100ML
100 INJECTION, SOLUTION INTRAVENOUS CONTINUOUS
Status: DISCONTINUED | OUTPATIENT
Start: 2018-07-31 | End: 2018-07-31 | Stop reason: HOSPADM

## 2018-07-31 RX ORDER — ENOXAPARIN SODIUM 100 MG/ML
40 INJECTION SUBCUTANEOUS EVERY 24 HOURS
Status: DISCONTINUED | OUTPATIENT
Start: 2018-07-31 | End: 2018-07-31 | Stop reason: HOSPADM

## 2018-07-31 RX ORDER — SCOLOPAMINE TRANSDERMAL SYSTEM 1 MG/1
1 PATCH, EXTENDED RELEASE TRANSDERMAL
Status: DISCONTINUED | OUTPATIENT
Start: 2018-07-31 | End: 2018-07-31 | Stop reason: HOSPADM

## 2018-07-31 RX ORDER — GLYCOPYRROLATE 0.2 MG/ML
INJECTION INTRAMUSCULAR; INTRAVENOUS AS NEEDED
Status: DISCONTINUED | OUTPATIENT
Start: 2018-07-31 | End: 2018-07-31 | Stop reason: HOSPADM

## 2018-07-31 RX ORDER — NEOSTIGMINE METHYLSULFATE 1 MG/ML
INJECTION INTRAVENOUS AS NEEDED
Status: DISCONTINUED | OUTPATIENT
Start: 2018-07-31 | End: 2018-07-31 | Stop reason: HOSPADM

## 2018-07-31 RX ORDER — OXYCODONE HYDROCHLORIDE 5 MG/1
5 TABLET ORAL
Qty: 20 TAB | Refills: 0 | Status: SHIPPED | OUTPATIENT
Start: 2018-07-31 | End: 2021-04-22

## 2018-07-31 RX ORDER — DEXAMETHASONE SODIUM PHOSPHATE 4 MG/ML
INJECTION, SOLUTION INTRA-ARTICULAR; INTRALESIONAL; INTRAMUSCULAR; INTRAVENOUS; SOFT TISSUE AS NEEDED
Status: DISCONTINUED | OUTPATIENT
Start: 2018-07-31 | End: 2018-07-31 | Stop reason: HOSPADM

## 2018-07-31 RX ORDER — FENTANYL CITRATE 50 UG/ML
INJECTION, SOLUTION INTRAMUSCULAR; INTRAVENOUS AS NEEDED
Status: DISCONTINUED | OUTPATIENT
Start: 2018-07-31 | End: 2018-07-31 | Stop reason: HOSPADM

## 2018-07-31 RX ORDER — LIDOCAINE HYDROCHLORIDE 20 MG/ML
INJECTION, SOLUTION EPIDURAL; INFILTRATION; INTRACAUDAL; PERINEURAL AS NEEDED
Status: DISCONTINUED | OUTPATIENT
Start: 2018-07-31 | End: 2018-07-31 | Stop reason: HOSPADM

## 2018-07-31 RX ADMIN — ONDANSETRON 4 MG: 2 INJECTION INTRAMUSCULAR; INTRAVENOUS at 13:00

## 2018-07-31 RX ADMIN — SODIUM CHLORIDE, SODIUM LACTATE, POTASSIUM CHLORIDE, AND CALCIUM CHLORIDE 100 ML/HR: 600; 310; 30; 20 INJECTION, SOLUTION INTRAVENOUS at 11:10

## 2018-07-31 RX ADMIN — ROCURONIUM BROMIDE 5 MG: 10 INJECTION, SOLUTION INTRAVENOUS at 11:43

## 2018-07-31 RX ADMIN — SUCCINYLCHOLINE CHLORIDE 100 MG: 20 INJECTION INTRAMUSCULAR; INTRAVENOUS at 11:43

## 2018-07-31 RX ADMIN — FENTANYL CITRATE 100 MCG: 50 INJECTION, SOLUTION INTRAMUSCULAR; INTRAVENOUS at 11:43

## 2018-07-31 RX ADMIN — HYDROMORPHONE HYDROCHLORIDE 0.5 MG: 1 INJECTION, SOLUTION INTRAMUSCULAR; INTRAVENOUS; SUBCUTANEOUS at 13:40

## 2018-07-31 RX ADMIN — SODIUM CHLORIDE, POTASSIUM CHLORIDE, SODIUM LACTATE AND CALCIUM CHLORIDE: 600; 310; 30; 20 INJECTION, SOLUTION INTRAVENOUS at 13:00

## 2018-07-31 RX ADMIN — PIPERACILLIN SODIUM AND TAZOBACTAM SODIUM 3.38 G: 3; .375 INJECTION, POWDER, LYOPHILIZED, FOR SOLUTION INTRAVENOUS at 03:46

## 2018-07-31 RX ADMIN — LIDOCAINE HYDROCHLORIDE 60 MG: 20 INJECTION, SOLUTION EPIDURAL; INFILTRATION; INTRACAUDAL; PERINEURAL at 11:43

## 2018-07-31 RX ADMIN — LEVOTHYROXINE SODIUM 175 MCG: 100 TABLET ORAL at 06:47

## 2018-07-31 RX ADMIN — FENTANYL CITRATE 50 MCG: 50 INJECTION, SOLUTION INTRAMUSCULAR; INTRAVENOUS at 12:10

## 2018-07-31 RX ADMIN — SODIUM CHLORIDE, POTASSIUM CHLORIDE, SODIUM LACTATE AND CALCIUM CHLORIDE: 600; 310; 30; 20 INJECTION, SOLUTION INTRAVENOUS at 10:55

## 2018-07-31 RX ADMIN — ROCURONIUM BROMIDE 10 MG: 10 INJECTION, SOLUTION INTRAVENOUS at 12:07

## 2018-07-31 RX ADMIN — OXYCODONE HYDROCHLORIDE 5 MG: 5 TABLET ORAL at 17:39

## 2018-07-31 RX ADMIN — GLYCOPYRROLATE 0.4 MG: 0.2 INJECTION INTRAMUSCULAR; INTRAVENOUS at 12:43

## 2018-07-31 RX ADMIN — DEXAMETHASONE SODIUM PHOSPHATE 8 MG: 4 INJECTION, SOLUTION INTRA-ARTICULAR; INTRALESIONAL; INTRAMUSCULAR; INTRAVENOUS; SOFT TISSUE at 12:03

## 2018-07-31 RX ADMIN — Medication 10 ML: at 14:46

## 2018-07-31 RX ADMIN — PIPERACILLIN SODIUM AND TAZOBACTAM SODIUM 3.38 G: 3; .375 INJECTION, POWDER, LYOPHILIZED, FOR SOLUTION INTRAVENOUS at 11:56

## 2018-07-31 RX ADMIN — ROCURONIUM BROMIDE 25 MG: 10 INJECTION, SOLUTION INTRAVENOUS at 11:53

## 2018-07-31 RX ADMIN — PROPOFOL 200 MG: 10 INJECTION, EMULSION INTRAVENOUS at 11:43

## 2018-07-31 RX ADMIN — NEOSTIGMINE METHYLSULFATE 2 MG: 1 INJECTION INTRAVENOUS at 12:45

## 2018-07-31 RX ADMIN — HYDROMORPHONE HYDROCHLORIDE 0.5 MG: 1 INJECTION, SOLUTION INTRAMUSCULAR; INTRAVENOUS; SUBCUTANEOUS at 13:50

## 2018-07-31 RX ADMIN — FENTANYL CITRATE 50 MCG: 50 INJECTION, SOLUTION INTRAMUSCULAR; INTRAVENOUS at 12:17

## 2018-07-31 RX ADMIN — FENTANYL CITRATE 50 MCG: 50 INJECTION, SOLUTION INTRAMUSCULAR; INTRAVENOUS at 12:47

## 2018-07-31 NOTE — ED NOTES
TRANSFER - OUT REPORT: 
 
Verbal report given to Jose Norrisgood on [facility]  being transferred to Med/Surg for routine progression of care Report consisted of patients Situation, Background, Assessment and  
Recommendations(SBAR). Information from the following report(s) SBAR and MAR was reviewed with the receiving nurse. Lines:  
Peripheral IV 07/30/18 Right Forearm (Active) Site Assessment Clean, dry, & intact 7/30/2018  1:50 PM  
Phlebitis Assessment 0 7/30/2018  1:50 PM  
Infiltration Assessment 0 7/30/2018  1:50 PM  
Dressing Status Clean, dry, & intact 7/30/2018  1:50 PM  
Dressing Type Transparent 7/30/2018  1:50 PM  
Hub Color/Line Status Pink 7/30/2018  1:50 PM  
  
 
Opportunity for questions and clarification was provided. Patient transported with: 
 Qasim Collier

## 2018-07-31 NOTE — OP NOTES
Laparoscopic Cholecystectomy with IOC Procedure Note      Marilee Diaz    MRN:  [id number]    Date of Procedure:  7/31/2018    Surgeon:  Lindsey Eckert MD.    Assistant:    Kimberley Parks. Anesthesia:  1. General endotracheal.  2.  0.5% Marcaine. Pre-operative Diagnosis:   Acute calculous cholecystitis. Post-operative Diagnosis:   Acute calculous cholecystitis. Procedure:   Laparoscopic cholecystectomy with intraoperative cholangiogram.    Indication:   Marilee Diaz is a 36 yo  female presents with symptomatic gallbladder disease and desires to have her gallbladder removed. Procedure Details: The patient was seen preoperatively in the holding area. The risks, benefits, and expected outcomes were discussed with the patient, and all questions were answered satisfactorily. The patient concurred with the proposed plan, giving informed consent. The patient was taken to the Operating Room. The patient was identified as Marilee Diaz, and the procedure verified as Laparoscopic Cholecystectomy with Intraoperative Cholangiogram, possible open. The patient was placed on the OR table in the supine position. Prior to the induction of anesthesia, antibiotic prophylaxis was administered. General endotracheal anesthesia was administered and tolerated well. The patient's abdomen was prepped with Chloraprep and draped in the usual sterile fashion. A Time Out was performed, and the above information was confirmed. Using a 15 blade, a small supraumbilical incision was made after injecting the local anesthetic. The abdominal wall was elevated with towel clips. Using the optiview technique, a 5-mm trocar was introduced into the abdominal cavity. Insufflation was provided through this trocar to establish a pneumoperitoneum of 15 mmHg, which the patient tolerated. The RUQ was visualized, and there no adhesions noted to prevent a laparoscope approach.   The local anesthetic was injected at all intended trocar sites. The three RUQ trocars were placed in the usual standard fashion with a 12-mm trocar in the epigastric region and two 5-mm trocars in the midclavicular and anterior axillary lines, respectively. The patient was placed in reverse Trendelenburg and rotated slightly toward the left. Attention was turned to the right upper quadrant. The liver appeared grossly normal.  The fundus of the gallbladder was grasped and retracted over the dome of the liver. There were adhesions to the gallbladder which were taken down with blunt dissection and cautery. The gallbladder appeared acutely inflamed. The infundibulum was grasped and retracted laterally. Using blunt dissection and cautery, the cystic duct was carefully dissected out and clearly visualized entering the gallbladder. The cystic artery was identified posterior to this within Calot's triangle. It was dissected out and clearly visualized entering the gallbladder as well. Once the critical view was obtained, a clip was placed at the base of the infundibulum. A choledochotomy was made in the very distal cystic duct with the endoscissors. There was immediate return of bile from the cystic duct. At this point, a 14-gauge angiocatheter was introduced into the RUQ abdominal wall. An Arrow cholangiogram catheter was advanced through it, flushed with saline, and inserted into the choledochotomy. Once within the cystic duct, the catheter was clipped, and the cystic duct flushed with normal saline. There was no evidence of any leakage from the choledochotomy, and the saline flushed easily without much resistance. All instruments were removed from the patient's abdomen. The patient was placed flat, and the C-arm was brought into the field. A  film was shot to obtain proper orientation and alignment of the C-arm. Under real-time fluoroscopy, full-strength Isovue contrast was injected slowly by hand.   There was immediate filling of the duodenum. There were no filling defects seen in the common bile duct and the duct was not dilated. Contrast easily refluxed up into the common hepatic duct and right and left hepatic ducts extending into the liver with no filling defects present. The C-arm was removed. The cholangiogram catheter was removed. The cystic duct was clipped three times proximally and divided completely at the choledochotomy site with the endoscissors. The cystic artery was clipped and divided in a similar fashion. Traction was then placed on the gallbladder as it was carefully dissected from the liver bed in retrograde fashion with cautery. Hemostasis was obtained along the liver bed as needed. The gallbladder was retrieved intact via an Endocatch bag and pulled up to the fascia at the epigastric incision. It was not able to be pulled out despite dilating the fascia with a large Eden clamp. Attention was turned back to the right upper quadrant. The gallbladder fossa was carefully inspected, and hemostasis was obtained as needed with cautery. The clips along the cystic duct were in place with no evidence of any bile leakage, and the clips along the cystic artery were in place with no evidence of any bleeding. The right upper quadrant and gallbladder fossa were gently irrigated with saline until effluent was clear. The two lateral RUQ trocars were removed removed under direct laparoscopic visualization, and there was no bleeding interally from either site. The laparoscope was removed, and the abdomen was decompressed. The supraumbilical trocar was then removed. Attention was turned back to the epigastric incision. The incision was extended all the way down to the fascia, allowing the gallbladder to be retrieved. Multiple small stones were palpated in the gallbladder. The gallbladder was passed off as a specimen. Hemostasis was obtained within all wounds as needed with cautery.   The wounds were irrigated with saline. The skin at all incisions was closed with 4-0 Monocryl in the usual subcuticular fashion. The wounds were cleaned and dried, and steri-strips and Bandaids were applied. The patient was extubated in the room. Estimated Blood Loss:   Less than 25 ml. Specimen:     ID Type Source Tests Collected by Time Destination   1 : gallbladder Preservative Gallbladder  Placido Browning MD 7/31/2018 1220 Pathology                Findings:  1. A grossly normal-appearing liver. 2.  Adhesions to an acutely inflamed gallbladder. 3.  Multiple small gallstones. 4.  A normal cholangiogram.       Counts: All sponge, needle, and instrument counts were correct x 2. Complications:    None. Disposition:  The patient was transferred to the recovery room in stable condition, having tolerated the procedure and anesthesia well.         Signed by:  Placido Browning MD          July 31, 2018        Bianca Mckeon MD

## 2018-07-31 NOTE — PROGRESS NOTES
Dr. Joanna Owens RN will call the patient to schedule follow-up appointment. Added to AVS. Vee Kaye CM Specialist

## 2018-07-31 NOTE — PERIOP NOTES
TRANSFER - OUT REPORT: 
 
Verbal report given to Gregor Steiner RN(name) on [facility]  being transferred to [facility](unit) for routine progression of care Report consisted of patients Situation, Background, Assessment and  
Recommendations(SBAR). Information from the following report(s) SBAR, Kardex, Intake/Output, MAR, Accordion and Cardiac Rhythm RSR was reviewed with the receiving nurse. Lines:  
Peripheral IV 07/30/18 Right Forearm (Active) Site Assessment Clean, dry, & intact 7/31/2018  1:22 PM  
Phlebitis Assessment 0 7/31/2018  1:22 PM  
Infiltration Assessment 0 7/31/2018  1:22 PM  
Dressing Status Clean, dry, & intact 7/31/2018  1:22 PM  
Dressing Type Tape;Transparent 7/31/2018  1:22 PM  
Hub Color/Line Status Pink; Infusing;Patent 7/31/2018  1:22 PM  
Action Taken Open ports on tubing capped 7/31/2018  1:22 PM  
Alcohol Cap Used Yes 7/31/2018  1:22 PM  
  
 
Opportunity for questions and clarification was provided. Patient transported with: 
 O2 @ 2 liters Registered Nurse

## 2018-07-31 NOTE — PROGRESS NOTES
Primary Nurse Marilyn Hoyt and Ivan Finney, RN performed a dual skin assessment on this patient Impairment noted- see wound doc flow sheet Kobe score is 21

## 2018-07-31 NOTE — H&P
Surgery History and Physical 
 
Subjective:  
  
Jack Pryor is a 37 y.o.  female who presents with RUQ abdominal pain. Mrs. Ike Oscar has a known h/o gallstones documented on US 2 years ago when she was worked up by GI for a fatty liver. She has done fine up until the past month when she had 3 attacks of sharp RUQ pain. Yesterday, her pain began acutely and radiated to her back and shoulder. The pain was associated with nausea, diminished appetite, and diarrhea. She denies any vomiting, jaundice, or fever. She has no h/o PUD, pancreatitis, IBD, or IBS. She has no had any abdominal surgery. Presently, she denies any pain. History reviewed. No pertinent past medical history. History reviewed. No pertinent surgical history. No family history on file. Social History Substance Use Topics  Smoking status: Not on file  Smokeless tobacco: Not on file  Alcohol use Not on file Prior to Admission medications Medication Sig Start Date End Date Taking? Authorizing Provider  
levothyroxine (SYNTHROID) 175 mcg tablet Take 175 mcg by mouth Daily (before breakfast). Yes Historical Provider  
calcium carbonate (OS-AARTI) 500 mg calcium (1,250 mg) tablet Take 1 Tab by mouth three (3) times daily. Yes Historical Provider  
ferrous sulfate (IRON) 325 mg (65 mg iron) EC tablet Take 325 mg by mouth Daily (before breakfast). Yes Historical Provider  
fish,bora,flax oils-om3,6,9no1 (OMEGA 3-6-9) 1,200 mg cap Take 1 Cap by mouth three (3) times daily. Yes Historical Provider  
cholecalciferol (VITAMIN D3) 1,000 unit tablet Take 1,000 Units by mouth daily. Yes Historical Provider  
co-enzyme Q-10 (CO Q-10) 100 mg capsule Take 100 mg by mouth daily. Yes Historical Provider  
vitamin E (AQUA GEMS) 400 unit capsule Take 400 Units by mouth two (2) times a day. Yes Historical Provider  
ascorbic acid, vitamin C, (VITAMIN C) 500 mg tablet Take 500 mg by mouth three (3) times daily.    Yes Historical Provider No Known Allergies Review of Systems: A comprehensive review of systems was negative except for that written in the History of Present Illness. Objective:  
 
 Patient Vitals for the past 8 hrs: 
 BP Temp Pulse Resp SpO2  
18 0722 147/81 98.3 °F (36.8 °C) (!) 51 12 99 % 18 0349 113/55 98.7 °F (37.1 °C) (!) 59 18 98 % 18 0036 147/70 98.9 °F (37.2 °C) 67 18 99 % Temp (24hrs), Av.8 °F (37.1 °C), Min:98.3 °F (36.8 °C), Max:99.1 °F (37.3 °C) Physical Exam: 
GENERAL: alert, cooperative, no distress, appears stated age, EYE: negative findings: anicteric sclera, LYMPHATIC: Cervical, supraclavicular nodes normal. , THROAT & NECK: normal, LUNG: clear to auscultation bilaterally, HEART: regular rate and rhythm, ABDOMEN: Soft, ND, minimal RUQ tenderness without guarding. There are no masses. , EXTREMITIES:  no edema, SKIN: Normal., NEUROLOGIC: negative, PSYCHIATRIC: non focal 
 
Assessment:  
 
Acute calculous cholecystitis. Plan:  
 
Mrs. Cristian Scott has been admitted for IV antibiotics and pain control. She will be taken to the OR today for a lap dez/IOC. I discussed the risks of the procedure including bleeding, infection, wound healing problems, persistent symptoms, blood clots, injury to the bowel, liver, or bile duct, and reaction to the prep, contrast, or local and general anesthetic. She understands the risks; any and all questions were answered to her satisfaction. Signed By: Mindy Simon., MD   
 2018

## 2018-07-31 NOTE — PROGRESS NOTES
Patient reports pain controlled with oxycodone. No nausea. Tolerated dinner. Patient ambulated to bathroom multiple times. No complaints. Requesting to go home. Discharge instructions and prescriptions reviewed with patient. Opportunity provided fo questions. Patient verbalized understanding. IV removed. Signed copy of discharge placed in the front of the patient's chart.

## 2018-07-31 NOTE — ANESTHESIA POSTPROCEDURE EVALUATION
Post-Anesthesia Evaluation and Assessment Patient: Amrit Larson MRN: [id number]  SSN: [id number] YOB: 1975  Age: 37 y.o. Sex: female Cardiovascular Function/Vital Signs Visit Vitals  /68  Pulse (!) 48  Temp 36.6 °C (97.9 °F)  Resp (!) 7  
 Ht 4' 11\" (1.499 m)  Wt 88.9 kg (195 lb 15.8 oz)  SpO2 97%  Breastfeeding No  
 BMI 39.58 kg/m2 Patient is status post general anesthesia for Procedure(s): CHOLECYSTECTOMY LAPAROSCOPIC with IOC. Nausea/Vomiting: None Postoperative hydration reviewed and adequate. Pain: 
Pain Scale 1: Numeric (0 - 10) (07/31/18 1400) Pain Intensity 1: 2 (07/31/18 1400) Managed Neurological Status:  
Neuro (WDL): Exceptions to WDL (07/31/18 1357) Neuro Neurologic State: Drowsy; Pharmacologically induced (comment) (07/31/18 1357) Orientation Level: Oriented X4 (07/31/18 0727) Cognition: Appropriate decision making; Appropriate for age attention/concentration; Appropriate safety awareness; Follows commands (07/31/18 8211) Speech: Clear (07/31/18 5101) LUE Motor Response: Purposeful (07/31/18 1357) LLE Motor Response: Purposeful (07/31/18 1357) RUE Motor Response: Purposeful (07/31/18 1357) RLE Motor Response: Purposeful (07/31/18 1357) At baseline Mental Status and Level of Consciousness: Arousable Pulmonary Status:  
O2 Device: Nasal cannula (07/31/18 1357) Adequate oxygenation and airway patent Complications related to anesthesia: None Post-anesthesia assessment completed. No concerns Signed By: Tricia Kumar MD   
 July 31, 2018

## 2018-07-31 NOTE — PERIOP NOTES
Attempted to update family on the patient condition, volunteer said they went back to the patient's 4th floor room. Will continue to monitor pt, pt. Notified that family was attempted to be updated.

## 2018-07-31 NOTE — PROGRESS NOTES
Bedside and Verbal shift change report given to [facility] (oncoming nurse) by Opal Sanchez RN (offgoing nurse). Report included the following information SBAR and Kardex.

## 2018-07-31 NOTE — ANESTHESIA PREPROCEDURE EVALUATION
Anesthetic History No history of anesthetic complications Review of Systems / Medical History Patient summary reviewed and pertinent labs reviewed Pulmonary Within defined limits Neuro/Psych Within defined limits Cardiovascular Within defined limits Exercise tolerance: >4 METS 
  
GI/Hepatic/Renal 
Within defined limits Endo/Other Hypothyroidism: well controlled Morbid obesity Other Findings Physical Exam 
 
Airway Mallampati: III 
TM Distance: > 6 cm Neck ROM: normal range of motion Mouth opening: Normal 
 
 Cardiovascular Rhythm: regular Rate: normal 
 
 
 
 Dental 
 
Dentition: Upper dentition intact and Lower dentition intact Pulmonary Breath sounds clear to auscultation Abdominal 
 
 
 
 Other Findings Anesthetic Plan ASA: 2 Anesthesia type: general 
 
 
 
 
Induction: Intravenous Anesthetic plan and risks discussed with: Patient

## 2018-07-31 NOTE — PROGRESS NOTES
7/31/2018 10:20 AM Met with pt. Charted address and phone number confirmed. Obs letter was given and explained. Reason for Admission:  Acute cholecystitis RRAT Score:  6 Plan for utilizing home health: not indicated Likelihood of Readmission:  low Transition of Care Plan: Home with family Pt will have family to assist if needed after discharge. Pt was independent with adls and driving prior to admission. Pt has rx coverage and fills her scripts a the [facility] on [address] [facility]. Pt's  will transport pt home. No discharge needs identified. CM will follow. KAM Swenson Care Management Interventions PCP Verified by CM: Yes (Dr. Maryanne Simon ) MyChart Signup: No 
Discharge Durable Medical Equipment: No 
Physical Therapy Consult: No 
Occupational Therapy Consult: No 
Speech Therapy Consult: No 
Current Support Network: Lives with Spouse, Own Home

## 2018-07-31 NOTE — DISCHARGE INSTRUCTIONS
Cholecystectomy: What to Expect at [address] [place] [address]  After your surgery, it is normal to feel weak and tired for several days after you return home. Your belly may be swollen. If you had laparoscopic surgery, you may also have pain in your shoulder for about 24 hours. You may have gas or need to burp a lot at first, and a few people get diarrhea. The diarrhea usually goes away in 2 to 4 weeks, but it may last longer. How quickly you recover depends on whether you had a laparoscopic or open surgery. · For a laparoscopic surgery, most people can go back to work or their normal routine in 1 to 2 weeks, but it may take longer, depending on the type of work you do. · For an open surgery, it will probably take 4 to 6 weeks before you get back to your normal routine. This care sheet gives you a general idea about how long it will take for you to recover; however, each person recovers at a different pace. Follow the steps below to get better as quickly as possible. How can you care for yourself at home? Activity    · Rest when you feel tired. Getting enough sleep will help you recover.     · Try to walk each day. Start out by walking a little more than you did the day before. Gradually increase the amount you walk. Walking boosts blood flow and helps prevent pneumonia and constipation.     · For about 2 weeks, avoid lifting anything that would make you strain. This may include a child, heavy grocery bags and milk containers, a heavy briefcase or backpack, cat litter or dog food bags, or a vacuum .     · Avoid strenuous activities, such as biking, jogging, weightlifting, and aerobic exercise, until your doctor says it is okay.     · You may shower 24 hours after surgery, if your doctor okays it. Pat the cuts (incisions) dry.   Do not take a bath for the first 2 weeks, and until after seen by your doctor.     · You may drive after 2 days and when you are no longer taking narcotic pain medicine and can quickly move your foot from the gas pedal to the brake. You must also be able to sit comfortably for a long period of time, even if you do not plan to go far because you might get caught in traffic.     · For a laparoscopic surgery, most people can go back to work or their normal routine in 1 to 2 weeks, but it may take longer. For an open surgery, it will probably take 4 to 6 weeks before you get back to your normal routine.     ·     Diet    · Eat smaller meals more often instead of fewer larger meals. You can eat a normal diet. If your stomach is upset, try bland, low-fat foods like plain rice, broiled chicken, toast, and yogurt, and avoid eating fatty foods for about 1 month. Fatty foods include hamburger, whole milk, cheese, and many snack foods.     · Drink plenty of fluids (unless your doctor tells you not to).   · If you have diarrhea, try avoiding spicy foods, dairy products, fatty foods, and alcohol. You can also watch to see if specific foods cause it, and stop eating them. If the diarrhea continues for more than 2 weeks, talk to your doctor.     · You may notice that your bowel movements are not regular right after your surgery. This is common. Try to avoid constipation and straining with bowel movements. You may want to take a fiber supplement every day. If you have not had a bowel movement after a couple of days, ask your doctor about taking a mild laxative. Medicines    · You can restart your medicines. Your doctor will also give you instructions about taking any new medicines.     · If you take blood thinners, such as warfarin (Coumadin), be sure to talk to your doctor. Your doctor will tell you if and when to start taking those medicines again. Make sure that you understand exactly what your doctor wants you to do.     · Take pain medicines exactly as directed. ¨ If the doctor gave you a prescription medicine for pain, take it as prescribed.   ¨ If you are not taking a prescription pain medicine, take an over-the-counter medicine such as acetaminophen (Tylenol), ibuprofen (Advil, Motrin), or naproxen (Aleve). Read and follow all instructions on the label. ¨ Do not take two or more pain medicines at the same time unless the doctor told you to. Many pain medicines contain acetaminophen, which is Tylenol. Too much Tylenol can be harmful.     · If you think your pain medicine is making you sick to your stomach:  ¨ Take your medicine after meals (unless your doctor tells you not to). ¨ Ask your doctor for a different pain medicine.     · If your doctor prescribed antibiotics, take them as directed. Do not stop taking them just because you feel better. You need to take the full course of antibiotics. Incision care    · Remove your bandages on Thursday, 8/2. You may leave your incisions uncovered. · If you have strips of tape on the incision, or cut, leave the tape on for a week or until it falls off.     · After 24 to 48 hours, wash the area daily with warm, soapy water, and pat it dry.     ·      · Keep the area clean and dry. You may cover it with a gauze bandage if it weeps or rubs against clothing. Change the bandage every day.    Ice    · To reduce swelling and pain, put ice or a cold pack on your belly for 10 to 20 minutes at a time. Do this every 1 to 2 hours. Put a thin cloth between the ice and your skin. Follow-up care is a key part of your treatment and safety. Be sure to make and go to all appointments, and call your doctor if you are having problems. It's also a good idea to know your test results and keep a list of the medicines you take. When should you call for help? Call 911 anytime you think you may need emergency care. For example, call if:    · You passed out (lost consciousness).     · You are short of breath. Charly Boys your doctor now or seek immediate medical care if:    · You are sick to your stomach and cannot drink fluids.     · You have abdominal pain that does not get better when you take your pain medicine. · Your eyes turn jaundice (yellow).     · You cannot pass stool or gas.     · You have signs of infection, such as:  ¨ Increased pain, swelling, warmth, or redness. ¨ Red streaks leading from the incision. ¨ Pus draining from the incision. ¨ A fever > 101.     · Bright red blood has soaked through the bandage over your incision.     · You have loose stitches, or your incision comes open.     · You have signs of a blood clot in your leg (called a deep vein thrombosis), such as:  ¨ Pain in your calf, back of knee, thigh, or groin. ¨ Redness and swelling in your leg or groin.    Watch closely for any changes in your health, and be sure to contact your doctor if you have any problems. Where can you learn more? Go to http://miguel-martha.info/. Enter 610 77 [id number] in the search box to learn more about \"Cholecystectomy: What to Expect at Home. \"  Current as of: May 12, 2017  Content Version: 11.7  © 4751-4500 [employer]. Care instructions adapted under license by [employer] (which disclaims liability or warranty for this information). If you have questions about a medical condition or this instruction, always ask your healthcare professional. [place] [address] any warranty or liability for your use of this information. Marilee Mendoza.  Dk Sung MD, FACS  General Surgery at [address] [place] [address], [address] [place] [address] Alissa Cohn   [facility]  [phone]  Fax [phone]

## 2018-08-01 ENCOUNTER — TELEPHONE (OUTPATIENT)
Dept: SURGERY | Age: 43
End: 2018-08-01

## 2018-08-01 NOTE — TELEPHONE ENCOUNTER
Called to follow up with patient after surgery patient states she is doing ok. Her throat is sore but she expected that. She had some soup this morning for breakfast, trying to drink fluids as much as possible. She is taking the pain medication and will start miralax today, last bowel movement was Monday. Urine flow is normal. She has reviewed her discharge instructions and has no questions.  Patient was transferred to schedule post op appointment

## 2018-08-10 ENCOUNTER — OFFICE VISIT (OUTPATIENT)
Dept: SURGERY | Age: 43
End: 2018-08-10

## 2018-08-10 VITALS
WEIGHT: 195 LBS | OXYGEN SATURATION: 98 % | HEART RATE: 82 BPM | DIASTOLIC BLOOD PRESSURE: 78 MMHG | SYSTOLIC BLOOD PRESSURE: 134 MMHG | TEMPERATURE: 99 F | HEIGHT: 59 IN | RESPIRATION RATE: 14 BRPM | BODY MASS INDEX: 39.31 KG/M2

## 2018-08-10 DIAGNOSIS — Z90.49 S/P LAPAROSCOPIC CHOLECYSTECTOMY: ICD-10-CM

## 2018-08-10 PROBLEM — K80.00 ACUTE CALCULOUS CHOLECYSTITIS: Status: RESOLVED | Noted: 2018-07-30 | Resolved: 2018-08-10

## 2018-08-10 NOTE — PROGRESS NOTES
Subjective:      Soni Gee is a 37 y.o. Asian female presents for postop care 10 days following a lap dez. Mrs. Emery is feeling much better. Her appetite is good, and she is eating a regular diet without difficulty. Her bowel movements are regular. She is not having any significant pain or problems with her incisions. Objective:     Visit Vitals    /78 (BP 1 Location: Left arm, BP Patient Position: Sitting)    Pulse 82    Temp 99 °F (37.2 °C) (Oral)    Resp 14    Ht 4' 11\" (1.499 m)    Wt 195 lb (88.5 kg)    LMP 07/29/2018    SpO2 98%    BMI 39.39 kg/m2       General:  alert, cooperative, no distress   Abdomen:  Soft, NT, ND. The incisions are clean, dry, and intact with no erythema. HEENT:  Sclerae are anicteric. Assessment:     1st POV, s/p lap dez with IOC. Plan:     The pathology report was discussed with Mrs. Emery. She is doing fine and can resume activity as tolerated and RTW without restrictions. She can f/u with me prn.

## 2018-08-13 ENCOUNTER — TELEPHONE (OUTPATIENT)
Dept: SURGERY | Age: 43
End: 2018-08-13

## 2018-08-13 NOTE — TELEPHONE ENCOUNTER
Patient wants to go back to work today. Needs note to go back. Did not sign release for us to send to HR.  [phone].

## 2018-08-14 NOTE — TELEPHONE ENCOUNTER
Called to inform patient her return to work letter was faxed to [employer] department this morning.  Patient understands and did not voice any further concerns

## 2018-08-18 ENCOUNTER — HOSPITAL ENCOUNTER (OUTPATIENT)
Dept: MAMMOGRAPHY | Age: 43
Discharge: HOME | End: 2018-08-18
Payer: COMMERCIAL

## 2018-08-18 DIAGNOSIS — Z12.31 SCREENING MAMMOGRAM, ENCOUNTER FOR: ICD-10-CM

## 2018-08-18 PROCEDURE — 77067 SCR MAMMO BI INCL CAD: CPT

## 2018-09-06 ENCOUNTER — HOSPITAL ENCOUNTER (OUTPATIENT)
Dept: MAMMOGRAPHY | Age: 43
Discharge: HOME | End: 2018-09-06
Attending: INTERNAL MEDICINE
Payer: COMMERCIAL

## 2018-09-06 DIAGNOSIS — R92.8 ABNORMAL MAMMOGRAM: ICD-10-CM

## 2018-09-06 PROCEDURE — 76642 ULTRASOUND BREAST LIMITED: CPT

## 2018-09-06 PROCEDURE — 77065 DX MAMMO INCL CAD UNI: CPT

## 2018-09-24 ENCOUNTER — APPOINTMENT (OUTPATIENT)
Dept: CT IMAGING | Age: 43
End: 2018-09-24
Attending: PHYSICIAN ASSISTANT
Payer: COMMERCIAL

## 2018-09-24 ENCOUNTER — HOSPITAL ENCOUNTER (EMERGENCY)
Age: 43
Discharge: HOME | End: 2018-09-24
Attending: EMERGENCY MEDICINE
Payer: COMMERCIAL

## 2018-09-24 ENCOUNTER — APPOINTMENT (OUTPATIENT)
Dept: GENERAL RADIOLOGY | Age: 43
End: 2018-09-24
Attending: PHYSICIAN ASSISTANT
Payer: COMMERCIAL

## 2018-09-24 VITALS
BODY MASS INDEX: 40.32 KG/M2 | HEIGHT: 59 IN | TEMPERATURE: 98.8 F | SYSTOLIC BLOOD PRESSURE: 161 MMHG | WEIGHT: 200 LBS | DIASTOLIC BLOOD PRESSURE: 91 MMHG | HEART RATE: 70 BPM | OXYGEN SATURATION: 100 % | RESPIRATION RATE: 14 BRPM

## 2018-09-24 DIAGNOSIS — M25.562 ACUTE PAIN OF LEFT KNEE: Primary | ICD-10-CM

## 2018-09-24 DIAGNOSIS — M25.40 JOINT EFFUSION: ICD-10-CM

## 2018-09-24 DIAGNOSIS — M19.90 ARTHRITIS: ICD-10-CM

## 2018-09-24 PROCEDURE — 99283 EMERGENCY DEPT VISIT LOW MDM: CPT

## 2018-09-24 PROCEDURE — L1830 KO IMMOB CANVAS LONG PRE OTS: HCPCS

## 2018-09-24 PROCEDURE — 74011250637 HC RX REV CODE- 250/637: Performed by: PHYSICIAN ASSISTANT

## 2018-09-24 PROCEDURE — 73700 CT LOWER EXTREMITY W/O DYE: CPT

## 2018-09-24 PROCEDURE — 73562 X-RAY EXAM OF KNEE 3: CPT

## 2018-09-24 RX ORDER — NAPROXEN 250 MG/1
500 TABLET ORAL
Status: COMPLETED | OUTPATIENT
Start: 2018-09-24 | End: 2018-09-24

## 2018-09-24 RX ORDER — DICLOFENAC SODIUM 10 MG/G
2 GEL TOPICAL 4 TIMES DAILY
Qty: 100 G | Refills: 0 | Status: SHIPPED | OUTPATIENT
Start: 2018-09-24 | End: 2021-04-22

## 2018-09-24 RX ORDER — NAPROXEN 500 MG/1
500 TABLET ORAL
Qty: 20 TAB | Refills: 0 | Status: SHIPPED | OUTPATIENT
Start: 2018-09-24 | End: 2021-04-22

## 2018-09-24 RX ADMIN — NAPROXEN 500 MG: 250 TABLET ORAL at 09:44

## 2018-09-24 NOTE — LETTER
[address] 
[facility] EMERGENCY DEPT 
[address] [facility] [address] [id number] [phone] Work/School Note Date: 9/24/2018 To Whom It May concern: 
 
Christian Nichols was seen and treated today in the emergency room by the following provider(s): 
Attending Provider: Germain Jesus DO Physician Assistant: LORENZO Handy. Christian Nichols may return to work on 9/27/18.  
 
Sincerely, 
 
 
 
 
LORENZO Handy

## 2018-09-24 NOTE — DISCHARGE INSTRUCTIONS
Knee Pain or Injury: Care Instructions  Your Care Instructions    Injuries are a common cause of knee problems. Sudden (acute) injuries may be caused by a direct blow to the knee. They can also be caused by abnormal twisting, bending, or falling on the knee. Pain, bruising, or swelling may be severe, and may start within minutes of the injury. Overuse is another cause of knee pain. Other causes are climbing stairs, kneeling, and other activities that use the knee. Everyday wear and tear, especially as you get older, also can cause knee pain. Rest, along with home treatment, often relieves pain and allows your knee to heal. If you have a serious knee injury, you may need tests and treatment. Follow-up care is a key part of your treatment and safety. Be sure to make and go to all appointments, and call your doctor if you are having problems. It's also a good idea to know your test results and keep a list of the medicines you take. How can you care for yourself at home? · Be safe with medicines. Read and follow all instructions on the label. ¨ If the doctor gave you a prescription medicine for pain, take it as prescribed. ¨ If you are not taking a prescription pain medicine, ask your doctor if you can take an over-the-counter medicine. · Rest and protect your knee. Take a break from any activity that may cause pain. · Put ice or a cold pack on your knee for 10 to 20 minutes at a time. Put a thin cloth between the ice and your skin. · Prop up a sore knee on a pillow when you ice it or anytime you sit or lie down for the next 3 days. Try to keep it above the level of your heart. This will help reduce swelling. · If your knee is not swollen, you can put moist heat, a heating pad, or a warm cloth on your knee. · If your doctor recommends an elastic bandage, sleeve, or other type of support for your knee, wear it as directed.   · Follow your doctor's instructions about how much weight you can put on your leg. Use a cane, crutches, or a walker as instructed. · Follow your doctor's instructions about activity during your healing process. If you can do mild exercise, slowly increase your activity. · Reach and stay at a healthy weight. Extra weight can strain the joints, especially the knees and hips, and make the pain worse. Losing even a few pounds may help. When should you call for help? Call 911 anytime you think you may need emergency care. For example, call if:    · You have symptoms of a blood clot in your lung (called a pulmonary embolism). These may include:  ¨ Sudden chest pain. ¨ Trouble breathing. ¨ Coughing up blood.    Call your doctor now or seek immediate medical care if:    · You have severe or increasing pain.     · Your leg or foot turns cold or changes color.     · You cannot stand or put weight on your knee.     · Your knee looks twisted or bent out of shape.     · You cannot move your knee.     · You have signs of infection, such as:  ¨ Increased pain, swelling, warmth, or redness. ¨ Red streaks leading from the knee. ¨ Pus draining from a place on your knee. ¨ A fever.     · You have signs of a blood clot in your leg (called a deep vein thrombosis), such as:  ¨ Pain in your calf, back of the knee, thigh, or groin. ¨ Redness and swelling in your leg or groin.    Watch closely for changes in your health, and be sure to contact your doctor if:    · You have tingling, weakness, or numbness in your knee.     · You have any new symptoms, such as swelling.     · You have bruises from a knee injury that last longer than 2 weeks.     · You do not get better as expected. Where can you learn more? Go to http://miguel-martha.info/. Enter K195 in the search box to learn more about \"Knee Pain or Injury: Care Instructions. \"  Current as of: November 20, 2017  Content Version: 11.7  © 9253-0543 [employer].  Care instructions adapted under license by [facility] (which disclaims liability or warranty for this information). If you have questions about a medical condition or this instruction, always ask your healthcare professional. [address] any warranty or liability for your use of this information. Knee Arthritis: Care Instructions  Your Care Instructions    Knee arthritis is a breakdown of the cartilage that cushions your knee joint. When the cartilage wears down, your bones rub against each other. This causes pain and stiffness. Knee arthritis tends to get worse with time. Treatment for knee arthritis involves reducing pain, making the leg muscles stronger, and staying at a healthy body weight. The treatment usually does not improve the health of the cartilage, but it can reduce pain and improve how well your knee works. You can take simple measures to protect your knee joints, ease your pain, and help you stay active. Follow-up care is a key part of your treatment and safety. Be sure to make and go to all appointments, and call your doctor if you are having problems. It's also a good idea to know your test results and keep a list of the medicines you take. How can you care for yourself at home? · Know that knee arthritis will cause more pain on some days than on others. · Stay at a healthy weight. Lose weight if you are overweight. When you stand up, the pressure on your knees from every pound of body weight is multiplied four times. So if you lose 10 pounds, you will reduce the pressure on your knees by 40 pounds. · Talk to your doctor or physical therapist about exercises that will help ease joint pain. ¨ Stretch to help prevent stiffness and to prevent injury before you exercise. You may enjoy gentle forms of yoga to help keep your knee joints and muscles flexible. ¨ Walk instead of jog. ¨ Ride a bike. This makes your thigh muscles stronger and takes pressure off your knee.   ¨ Wear well-fitting and comfortable shoes.  ¨ Exercise in chest-deep water. This can help you exercise longer with less pain. ¨ Avoid exercises that include squatting or kneeling. They can put a lot of strain on your knees. ¨ Talk to your doctor to make sure that the exercise you do is not making the arthritis worse. · Do not sit for long periods of time. Try to walk once in a while to keep your knee from getting stiff. · Ask your doctor or physical therapist whether shoe inserts may reduce your arthritis pain. · If you can afford it, get new athletic shoes at least every year. This can help reduce the strain on your knees. · Use a device to help you do everyday activities. ¨ A cane or walking stick can help you keep your balance when you walk. Hold the cane or walking stick in the hand opposite the painful knee. ¨ If you feel like you may fall when you walk, try using crutches or a front-wheeled walker. These can prevent falls that could cause more damage to your knee. ¨ A knee brace may help keep your knee stable and prevent pain. ¨ You also can use other things to make life easier, such as a higher toilet seat and handrails in the bathtub or shower. · Take pain medicines exactly as directed. ¨ Do not wait until you are in severe pain. You will get better results if you take it sooner. ¨ If you are not taking a prescription pain medicine, take an over-the-counter medicine such as acetaminophen (Tylenol), ibuprofen (Advil, Motrin), or naproxen (Aleve). Read and follow all instructions on the label. ¨ Do not take two or more pain medicines at the same time unless the doctor told you to. Many pain medicines have acetaminophen, which is Tylenol. Too much acetaminophen (Tylenol) can be harmful. ¨ Tell your doctor if you take a blood thinner, have diabetes, or have allergies to shellfish. · Ask your doctor if you might benefit from a shot of steroid medicine into your knee. This may provide pain relief for several months.   · Many people take the supplements glucosamine and chondroitin for osteoarthritis. Some people feel they help, but the medical research does not show that they work. Talk to your doctor before you take these supplements. When should you call for help? Call your doctor now or seek immediate medical care if:    · You have sudden swelling, warmth, or pain in your knee.     · You have knee pain and a fever or rash.     · You have such bad pain that you cannot use your knee.    Watch closely for changes in your health, and be sure to contact your doctor if you have any problems. Where can you learn more? Go to http://miguel-martha.info/. Enter C565 in the search box to learn more about \"Knee Arthritis: Care Instructions. \"  Current as of: October 10, 2017  Content Version: 11.7  © 1712-0601 [employer]. Care instructions adapted under license by [employer] (which disclaims liability or warranty for this information). If you have questions about a medical condition or this instruction, always ask your healthcare professional. [address] any warranty or liability for your use of this information.

## 2018-09-24 NOTE — ED PROVIDER NOTES
HPI Comments: Glo Fisher is a 37 y.o. female who presents ambulatory to the ED with a c/o left knee pain. Pt reports that she has been experiencing left knee pain since Thursday (9/20/18). Pt states that her knee hurt all day on Thursday, was better on Friday, but worsened on Saturday and was accompanied by swelling. She notes that her current pain is more medially than laterally, and descries her current pain as \"aching\" in quality. She rates her current pain as 9/10 in severity. Pt denies any recent injury or falls. She states that her pain is exacerbated with walking up and down stairs, and with flexion and extension of the knee. Pt reports that she took Ibuprofen yesterday, has been using Epsom salts, and alternating ice and heat, but no treatment has provided significant relief. She notes that she was seen by an orthopedist \"years ago\" for right knee pain, and had fluid taken out. Pt denies having left knee pain at the time of that visit. She specifically denies any fevers, chills, nausea, vomiting, chest pain, abd pain, urinary sx, shortness of breath, headache, rash, diarrhea, sweating or weight loss. PCP: Romero Mirza MD 
PMHx significant for: Obesity, h/o thyroid CA 
PSHx significant for: Thyroidectomy, cholecystectomy Social Hx: Tobacco: Negative; EtOH: Positive (0.6 oz alcohol/week); Illicit drug use: Negative There are no further complaints or symptoms at this time. Note written by Madalyn Ho, as dictated by Jenny Hansen PA-C 9:23 AM 
 
 
 
The history is provided by the patient. No  was used. Past Medical History:  
Diagnosis Date  Obesity, Class II, BMI 35-39.9 Past Surgical History:  
Procedure Laterality Date  HX CHOLECYSTECTOMY  07/31/2018 Laparoscopic cholecystectomy with IOC.  HX HEENT    
 HX THYROIDECTOMY Bilateral 2005 Thyroid cancer - left lobe in January and then in February right lobe History reviewed. No pertinent family history. Social History Social History  Marital status:  Spouse name: N/A  
 Number of children: N/A  
 Years of education: N/A Occupational History  Not on file. Social History Main Topics  Smoking status: Never Smoker  Smokeless tobacco: Never Used  Alcohol use 0.6 oz/week 1 Glasses of wine per week Comment: during holidays  Drug use: No  
 Sexual activity: Not on file Other Topics Concern  Not on file Social History Narrative ALLERGIES: Review of patient's allergies indicates no known allergies. Review of Systems Constitutional: Negative for appetite change, chills, fatigue, fever and unexpected weight change. HENT: Negative for congestion, ear pain, postnasal drip, rhinorrhea, sneezing and sore throat. Eyes: Negative for redness and visual disturbance. Respiratory: Negative for cough, shortness of breath and wheezing. Cardiovascular: Negative for chest pain, palpitations and leg swelling. Gastrointestinal: Negative for abdominal pain, anal bleeding, constipation, diarrhea, nausea and vomiting. Genitourinary: Negative for difficulty urinating, dysuria, frequency and hematuria. Musculoskeletal: Positive for arthralgias (Left knee) and joint swelling (Left knee). Negative for back pain, myalgias and neck stiffness. Skin: Negative for rash. Allergic/Immunologic: Negative for immunocompromised state. Neurological: Negative for dizziness, syncope, weakness, light-headedness and headaches. Hematological: Negative for adenopathy. Vitals:  
 09/24/18 0916 09/24/18 1130 BP: 172/73 (!) 161/91 Pulse: 75 70 Resp: 16 14 Temp: 98.8 °F (37.1 °C) SpO2: 99% 100% Weight: 90.7 kg (200 lb) Height: 4' 11\" (1.499 m) Physical Exam  
Constitutional: She is oriented to person, place, and time. She appears well-developed and well-nourished. No distress. Above average bmi female HENT:  
Head: Normocephalic and atraumatic. Right Ear: External ear normal.  
Left Ear: External ear normal.  
Eyes: EOM are normal. Pupils are equal, round, and reactive to light. Neck: Neck supple. Cardiovascular: Normal rate, regular rhythm, normal heart sounds and intact distal pulses. Exam reveals no gallop and no friction rub. No murmur heard. Pulmonary/Chest: Effort normal and breath sounds normal. No stridor. No respiratory distress. She has no wheezes. She has no rales. She exhibits no tenderness. Musculoskeletal: Normal range of motion. She exhibits tenderness. She exhibits no edema or deformity. Left knee TTP diffusely, more focal medially with slight sts. No deformity. No lesions. ROM limited by pain. No laxity. Normothermic. Cap refill brisk. Distal n/v intact. Ambulates with antalgic gait Neurological: She is alert and oriented to person, place, and time. No cranial nerve deficit. Coordination normal.  
Skin: No rash noted. No erythema. No pallor. Psychiatric: She has a normal mood and affect. Her behavior is normal.  
Nursing note and vitals reviewed. MDM Number of Diagnoses or Management Options Acute pain of left knee:  
Arthritis:  
Joint effusion:  
  
Amount and/or Complexity of Data Reviewed Tests in the radiology section of CPT®: ordered and reviewed Review and summarize past medical records: yes Independent visualization of images, tracings, or specimens: yes Patient Progress Patient progress: stable ED Course Procedures 9:35 AM 
Discussed pt, sx, hx and current findings with Alejandro Ramos DO. He is in agreement with plan. Will get xrays, given nsaid and ice. Will reevaluate Ines Gould. LARRY Marquis 
 
 
10:27 AM 
 xray shows effusion, arthritis and questionable fx. Recommended ct. Will get ct in ED. Pt updated and in agreement with plan 
Ines Gould.  LARRY Marquis 
 
 
1:15 PM  
 Ct without fracture, updated pt on findings. Knee immobilizer and crutches given. Pt to follow with ortho 
Emanuel Lara. QuickLARRY 
 
  
LABORATORY TESTS: 
No results found for this or any previous visit (from the past 12 hour(s)). IMAGING RESULTS: 
 
Ct Low Ext Lt Wo Cont Result Date: 9/24/2018 INDICATION: Left knee pain. Abnormal radiographs. Patient has history of thyroid cancer status post thyroidectomy. Atraumatic pain since Thursday. COMPARISON: Earlier radiographs. EXAM: Axial CT imaging of the left knee with thin section axial reconstructions as well as coronal sagittal reformations. CT dose reduction was achieved through use of a standardized protocol tailored for this examination and automatic exposure control for dose modulation. Alie Segovia FINDINGS: Bone mineralization is normal. No fracture or dislocation is shown. There is tricompartmental joint space narrowing which is moderate in the medial compartment and mild in the lateral and patellofemoral compartments, associated with small marginal osteophytes and several loose bodies measuring up to 6 to 7 mm in size, located in the posterior recess of the medial compartment and within the femoral intercondylar space. There is a small knee effusion and a small Baker's cyst is also shown. The muscles are normal in size, contour and echotexture. There is partial visualization of a fatty lesion within the gracilis muscle, noted on the first few images measuring up to 14 mm transverse. IMPRESSION: 1. No acute fracture or dislocation demonstrated. 2. Osteoarthrosis with loose bodies, small knee effusion and small Baker's cyst. 3. Partial visualization of nonspecific fatty lesion within the gracilis muscle noted on the first few images of study. Xr Knee Lt 3 V Result Date: 9/24/2018 EXAM:  XR KNEE LT 3 V INDICATION:   left knee pain COMPARISON: None. FINDINGS: 3 views of the left knee were obtained.  There is lucency and cortical irregularity along the lateral aspect of the lateral femoral condyle. A small joint effusion is present. There is moderate tricompartmental degenerative disease. IMPRESSION:  1. Lucency and cortical irregularity along the lateral aspect of the lateral femoral condyle, which is suspicious for a nondisplaced acute fracture. Recommend CT for further evaluation. 2. Moderate tricompartmental degenerative disease. Small joint effusion. 23X MEDICATIONS GIVEN: 
Medications  
naproxen (NAPROSYN) tablet 500 mg (500 mg Oral Given 9/24/18 0944) IMPRESSION: 
1. Acute pain of left knee 2. Arthritis 3. Joint effusion PLAN: 
1. Discharge Medication List as of 9/24/2018  1:24 PM  
  
START taking these medications Details  
naproxen (NAPROSYN) 500 mg tablet Take 1 Tab by mouth every twelve (12) hours as needed for Pain., Print, Disp-20 Tab, R-0  
  
diclofenac (VOLTAREN) 1 % gel Apply 2 g to affected area four (4) times daily. , Print, Disp-100 g, R-0  
  
  
CONTINUE these medications which have NOT CHANGED Details  
oxyCODONE IR (ROXICODONE) 5 mg immediate release tablet Take 1 Tab by mouth every four (4) hours as needed. Max Daily Amount: 30 mg., Print, Disp-20 Tab, R-0  
  
levothyroxine (SYNTHROID) 175 mcg tablet Take 175 mcg by mouth Daily (before breakfast). , Historical Med  
  
calcium carbonate (OS-AARTI) 500 mg calcium (1,250 mg) tablet Take 1 Tab by mouth three (3) times daily. , Historical Med  
  
ferrous sulfate (IRON) 325 mg (65 mg iron) EC tablet Take 325 mg by mouth Daily (before breakfast). , Historical Med  
  
fish,bora,flax oils-om3,6,9no1 (OMEGA 3-6-9) 1,200 mg cap Take 1 Cap by mouth three (3) times daily. , Historical Med  
  
cholecalciferol (VITAMIN D3) 1,000 unit tablet Take 1,000 Units by mouth daily. , Historical Med  
  
co-enzyme Q-10 (CO Q-10) 100 mg capsule Take 100 mg by mouth daily. , Historical Med  
  
 vitamin E (AQUA GEMS) 400 unit capsule Take 400 Units by mouth two (2) times a day., Historical Med  
  
ascorbic acid, vitamin C, (VITAMIN C) 500 mg tablet Take 500 mg by mouth three (3) times daily. , Historical Med 2. Follow-up Information Follow up With Details Comments Contact Info Concha Uribe MD Schedule an appointment as soon as possible for a visit 2-4 days for recheck  [address] [facility] 
[phone] Return to ED if worse 1:16 PM 
Pt has been reexamined. Pt has no new complaints, changes or physical findings. Care plan outlined and precautions discussed. All available results were reviewed with pt. All medications were reviewed with pt. All of pt's questions and concerns were addressed. Pt agrees to F/U as instructed and agrees to return to ED upon further deterioration. Pt is ready to go home.  
LORENZO Mascorro

## 2018-09-24 NOTE — ED TRIAGE NOTES
Pt arrives ambulatory to ED with steady gait, pt states started to have left knee pain on Thursday, denies any trauma or injury to the knee, no obvious deformities. Pt states hurting more on the side of knee.

## 2019-03-06 ENCOUNTER — HOSPITAL ENCOUNTER (OUTPATIENT)
Dept: MAMMOGRAPHY | Age: 44
Discharge: HOME | End: 2019-03-06
Attending: INTERNAL MEDICINE
Payer: COMMERCIAL

## 2019-03-06 DIAGNOSIS — R92.8 FOLLOW-UP EXAMINATION OF ABNORMAL MAMMOGRAM: ICD-10-CM

## 2019-03-06 PROCEDURE — 77065 DX MAMMO INCL CAD UNI: CPT

## 2019-06-17 ENCOUNTER — HOSPITAL ENCOUNTER (OUTPATIENT)
Dept: ULTRASOUND IMAGING | Age: 44
Discharge: HOME | End: 2019-06-17
Attending: NURSE PRACTITIONER
Payer: COMMERCIAL

## 2019-06-17 DIAGNOSIS — C73 MALIGNANT NEOPLASM OF THYROID GLAND (HCC): ICD-10-CM

## 2019-06-17 PROCEDURE — 76536 US EXAM OF HEAD AND NECK: CPT

## 2019-06-21 ENCOUNTER — HOSPITAL ENCOUNTER (OUTPATIENT)
Dept: VASCULAR SURGERY | Age: 44
Discharge: HOME | End: 2019-06-21
Attending: INTERNAL MEDICINE
Payer: COMMERCIAL

## 2019-06-21 DIAGNOSIS — H54.62 VISION LOSS OF LEFT EYE: ICD-10-CM

## 2019-06-21 LAB
LEFT CCA DIST DIAS: 31.7 CM/S
LEFT CCA DIST SYS: 89.8 CM/S
LEFT CCA PROX DIAS: 33.9 CM/S
LEFT CCA PROX SYS: 117.5 CM/S
LEFT ECA DIAS: 18.73 CM/S
LEFT ECA SYS: 80.1 CM/S
LEFT ICA DIST DIAS: 47.8 CM/S
LEFT ICA DIST SYS: 101.1 CM/S
LEFT ICA MID DIAS: 49.4 CM/S
LEFT ICA MID SYS: 104.4 CM/S
LEFT ICA PROX DIAS: 25.9 CM/S
LEFT ICA PROX SYS: 64.4 CM/S
LEFT ICA/CCA SYS: 1.16
LEFT SUBCLAVIAN DIAS: 0 CM/S
LEFT SUBCLAVIAN SYS: 171 CM/S
LEFT VERTEBRAL DIAS: 28.13 CM/S
LEFT VERTEBRAL SYS: 64.4 CM/S
RIGHT CCA DIST DIAS: 20.8 CM/S
RIGHT CCA DIST SYS: 75.7 CM/S
RIGHT CCA PROX DIAS: 17.6 CM/S
RIGHT CCA PROX SYS: 83.8 CM/S
RIGHT ECA DIAS: 26.96 CM/S
RIGHT ECA SYS: 157.1 CM/S
RIGHT ICA DIST DIAS: 54.3 CM/S
RIGHT ICA DIST SYS: 114 CM/S
RIGHT ICA MID DIAS: 47.8 CM/S
RIGHT ICA MID SYS: 106 CM/S
RIGHT ICA PROX DIAS: 31.8 CM/S
RIGHT ICA PROX SYS: 69.4 CM/S
RIGHT ICA/CCA SYS: 1.5
RIGHT SUBCLAVIAN DIAS: 9.06 CM/S
RIGHT SUBCLAVIAN SYS: 157.7 CM/S
RIGHT VERTEBRAL DIAS: 19.34 CM/S
RIGHT VERTEBRAL SYS: 50.1 CM/S

## 2019-06-21 PROCEDURE — 93880 EXTRACRANIAL BILAT STUDY: CPT

## 2019-06-28 ENCOUNTER — HOSPITAL ENCOUNTER (OUTPATIENT)
Dept: MRI IMAGING | Age: 44
Discharge: HOME | End: 2019-06-28
Attending: INTERNAL MEDICINE
Payer: COMMERCIAL

## 2019-06-28 VITALS — BODY MASS INDEX: 43.64 KG/M2 | WEIGHT: 216.05 LBS

## 2019-06-28 DIAGNOSIS — G45.3 AMAUROSIS FUGAX: ICD-10-CM

## 2019-06-28 PROCEDURE — A9575 INJ GADOTERATE MEGLUMI 0.1ML: HCPCS | Performed by: RADIOLOGY

## 2019-06-28 PROCEDURE — 70553 MRI BRAIN STEM W/O & W/DYE: CPT

## 2019-06-28 PROCEDURE — 74011250636 HC RX REV CODE- 250/636: Performed by: RADIOLOGY

## 2019-06-28 RX ORDER — GADOTERATE MEGLUMINE 376.9 MG/ML
15 INJECTION INTRAVENOUS
Status: COMPLETED | OUTPATIENT
Start: 2019-06-28 | End: 2019-06-28

## 2019-06-28 RX ADMIN — GADOTERATE MEGLUMINE 15 ML: 376.9 INJECTION INTRAVENOUS at 11:45

## 2020-08-11 ENCOUNTER — EMPLOYEE WELLNESS (OUTPATIENT)
Dept: FAMILY MEDICINE CLINIC | Age: 45
End: 2020-08-11

## 2020-08-11 LAB
CHOLEST SERPL-MCNC: 207 MG/DL
EST. AVERAGE GLUCOSE BLD GHB EST-MCNC: 123 MG/DL
GLUCOSE SERPL-MCNC: 136 MG/DL (ref 65–100)
HBA1C MFR BLD: 5.9 % (ref 4–5.6)
HDLC SERPL-MCNC: 35 MG/DL
LDLC SERPL CALC-MCNC: 125.6 MG/DL (ref 0–100)
TRIGL SERPL-MCNC: 232 MG/DL (ref ?–150)

## 2020-10-08 ENCOUNTER — PATIENT OUTREACH (OUTPATIENT)
Dept: OTHER | Age: 45
End: 2020-10-08

## 2020-10-08 NOTE — PROGRESS NOTES
Patient on report as eligible for Case Management. Left discreet message on voicemail with this CM contact information. Will attempt to contact again to offer [address] [place] [address] Management services. UTR letter sent.

## 2020-10-08 NOTE — LETTER
10/8/2020 9:54 AM    Ms. [address] & [facility]  [address]en [address] [id number]          Dear Ms. [facility],    My name is Mcglynn Riki, Associate Care Manager for [employer] and I have tried to reach you. The Associate Care Management (ACM) program is a free-of-charge confidential service provided to our associates and their family members covered by the [facility]. The program will provide an associate and his/her family with the [address] expertise to assist in navigating the health care delivery system, provider services, and their overall care needsso as to assure and improve health care interactions and enhance the quality of life. This program is designed to provide you with the opportunity to have a [employer] partner with you for the following services:     1) when you come home from the hospital or emergency room   2) when help is needed to manage your disease   3) when you need assistance coordinating services or appointments  4) when you need additional education, resources or assistance reaching your Be Well Health Program goals/requirements such as Be Well With Diabetes      [address] is dedicated to empowering the good health of its community and improving the quality of care and care experiences for associates and their families. We are committed to safeguarding patient confidentiality and privacy, assuring that every associate has the respect he or she deserves in managing their health. The information shared with your care manager will not be shared with anyone else aside from those you identify as part of your care team, and will only be used to assist you with any identified care needs. Please contact me if you would like this service provided to you. Sincerely,    Annie Lorenzo RN, [place] [facility], [address] [place] [address] 31 [facility]  [id number]  Crystal Graham [phone]  F [phone]  [email or web address]  2 [facility] email: [email or web address]

## 2020-10-16 ENCOUNTER — PATIENT OUTREACH (OUTPATIENT)
Dept: OTHER | Age: 45
End: 2020-10-16

## 2020-10-16 NOTE — PROGRESS NOTES
Patient identified as eligible for [address] [place] [address] services through [facility]. Second telephone outreach attempted. Left discreet voicemail with this CM confidential contact information. UTR letter sent on 10/8.

## 2020-10-23 ENCOUNTER — PATIENT OUTREACH (OUTPATIENT)
Dept: OTHER | Age: 45
End: 2020-10-23

## 2020-10-23 NOTE — LETTER
10/23/2020 8:54 AM    Ms. [facility] - [address]  [place] [address]          Dear Saskia Weaver,    My name is Lane Lyn, Associate Care Manager for [address] [place] [address], and I have been trying to reach you. The Associate Care Management [facility]) program is a free-of-charge, confidential service provided to our employees and their family members covered by the [facility]. I can help you with care transitions such as when you come home from the hospital, when help is needed to manage your disease, or when you need assistance coordinating services or appointments. As healthcare providers, we know that patients do better when they have close follow up with a primary care provider (PCP). I can help you find one that is convenient to you and covered by your insurance. I can also help you understand any after visit instructions, such as what symptoms to watch out for, or any new or changed medications. We can work together using your preferred communication -- telephone, email, [employer]. If you do not have a [employer] account, I can help you request access. Our program is designed to provide you with the opportunity to have a [address] [place] [address] FOR CHILDREN partner with you for your healthcare needs. Due to not being able to reach you, I am closing out the current program, but will remain available to you should you have any questions. Please contact me at the below number if I can provide you with assistance for any of the above services. Annie Burch, [address] [place], [place] [address]ley , [address] [phone]  F [phone]  [email or web address]  [address] [place] [address] AC email: [email or web address]

## 2021-04-21 NOTE — PROGRESS NOTES
Abnormal bleeding note      Tarik Goins is a 48 y.o. female who complains of vaginal bleeding problems. Her current method of family planning is oral progesterone-only contraceptive. She developed this problem approximately a few years ago. It was fixed by taking provera daily (?dose, ?20mg). She has been off of it since October. No periods for the first couple of months. Then regular monthly cycles for a couple of months. Then extremely heavy bleeding with gushes, started 4/12. Had very heavy bleeding for first day, then \"normal\" flow, stopped on 4/16. She has had vaginal bleeding which she describes as heavy, and with large clots lasting up to 6 days. Pad or tampon count: changes every 1 hours. Associated symptoms include none. Alleviating factors: none  Aggravating factors: none    The patient is sexually active. Last Pap smear:was normal per pt. Reports US x2, told uterus was enlarged, fibroids x2 (small). Does not think SIS was done. Thinks EMB was done as well. Tried IUD > expelled. All in 2019. H/o thyroid cancer. Has labs checked annually, due in June. HTN    Her relevant past medical history:   Past Medical History:   Diagnosis Date    Hypertension     Obesity, Class II, BMI 35-39.9     Pre-diabetes     Thyroid cancer ([address] 2005    tx'd with lobectomy, then total thyroidectomy. Tx'd with I131 x2. Past Surgical History:   Procedure Laterality Date    HX CHOLECYSTECTOMY  07/31/2018    Laparoscopic cholecystectomy with IOC.  HX HEENT      HX THYROIDECTOMY Bilateral 2005    Thyroid cancer - left lobe in January and then in February right lobe     Social History     Occupational History    Not on file   Tobacco Use    Smoking status: Never Smoker    Smokeless tobacco: Never Used   Substance and Sexual Activity    Alcohol use: Yes     Alcohol/week: 1.0 standard drinks     Types: 1 Glasses of wine per week     Comment: during holidays    Drug use:  No  Sexual activity: Not on file     History reviewed. No pertinent family history. No Known Allergies  Prior to Admission medications    Medication Sig Start Date End Date Taking? Authorizing Provider   hydroCHLOROthiazide (HYDRODIURIL) 12.5 mg tablet  2/10/21  Yes Provider, Historical   metFORMIN (GLUCOPHAGE) 500 mg tablet  4/21/21  Yes Provider, Historical   levothyroxine (SYNTHROID) 175 mcg tablet Take 175 mcg by mouth Daily (before breakfast). Yes Provider, Historical   calcium carbonate (OS-AARTI) 500 mg calcium (1,250 mg) tablet Take 1 Tab by mouth three (3) times daily. Yes Provider, Historical   ferrous sulfate (IRON) 325 mg (65 mg iron) EC tablet Take 325 mg by mouth Daily (before breakfast). Yes Provider, Historical   fish,bora,flax oils-om3,6,9no1 (OMEGA 3-6-9) 1,200 mg cap Take 1 Cap by mouth three (3) times daily. Yes Provider, Historical   cholecalciferol (VITAMIN D3) 1,000 unit tablet Take 1,000 Units by mouth daily. Yes Provider, Historical   co-enzyme Q-10 (CO Q-10) 100 mg capsule Take 100 mg by mouth daily. Yes Provider, Historical   vitamin E (AQUA GEMS) 400 unit capsule Take 400 Units by mouth two (2) times a day. Yes Provider, Historical   ascorbic acid, vitamin C, (VITAMIN C) 500 mg tablet Take 500 mg by mouth three (3) times daily.    Yes Provider, Historical        Review of Systems - History obtained from the patient  Constitutional: negative for weight loss, fever, night sweats  HEENT: negative for hearing loss, earache, congestion, snoring, sorethroat  CV: negative for chest pain, palpitations, edema  Resp: negative for cough, shortness of breath, wheezing  Breast: negative for breast lumps, nipple discharge, galactorrhea  GI: negative for change in bowel habits, abdominal pain, black or bloody stools  : negative for frequency, dysuria, hematuria  MSK: negative for back pain, joint pain, muscle pain  Skin: negative for itching, rash, hives  Neuro: negative for dizziness, headache, confusion, weakness  Psych: negative for anxiety, depression, change in mood  Heme/lymph: negative for bleeding, bruising, pallor      Objective:    Visit Vitals  BP (!) 156/84   Pulse 95   Wt 202 lb (91.6 kg)   LMP 04/12/2021   BMI 40.80 kg/m²          PHYSICAL EXAMINATION    Constitutional  · Appearance: well-nourished, well developed, alert, in no acute distress    HENT  · Head and Face: appears normal    Gastrointestinal  · Abdominal Examination: abdomen non-tender to palpation, normal bowel sounds, no masses present  · Liver and spleen: no hepatomegaly present, spleen not palpable  · Hernias: no hernias identified    Genitourinary  · External Genitalia: normal appearance for age, no discharge present, no tenderness present, no inflammatory lesions present, no masses present, no atrophy present  · Vagina: normal vaginal vault without central or paravaginal defects, no discharge or odor present, no inflammatory lesions present, no masses present  · Bladder: non-tender to palpation  · Urethra: appears normal  · Cervix: normal   · Uterus: upper normal/bulky  · Adnexa: no adnexal tenderness present, no adnexal masses present  · Perineum: perineum within normal limits, no evidence of trauma, no rashes or skin lesions present  · Anus: anus within normal limits, no hemorrhoids present  · Inguinal Lymph Nodes: no lymphadenopathy present    Skin  · General Inspection: no rash, no lesions identified    Neurologic/Psychiatric  · Mental Status:  · Orientation: grossly oriented to person, place and time  · Mood and Affect: mood normal, affect appropriate    Assessment:   Menorrhagia  H/o fibroids  ? adenomyosis    Plan:   Provera x10d, cycle days 16-25  Declines labs (CBC, iron, TSH)  RTO wk of 5/17 for SIS/possible EMB  Request records    Orders Placed This Encounter    medroxyPROGESTERone (PROVERA) 10 mg tablet     Sig: Take 1 Tab by mouth daily for 10 days.      Dispense:  10 Tab     Refill:  0

## 2021-04-22 ENCOUNTER — OFFICE VISIT (OUTPATIENT)
Dept: OBGYN CLINIC | Age: 46
End: 2021-04-22
Payer: COMMERCIAL

## 2021-04-22 VITALS
BODY MASS INDEX: 40.8 KG/M2 | WEIGHT: 202 LBS | HEART RATE: 95 BPM | DIASTOLIC BLOOD PRESSURE: 84 MMHG | SYSTOLIC BLOOD PRESSURE: 156 MMHG

## 2021-04-22 DIAGNOSIS — Z87.42 HISTORY OF IRREGULAR MENSTRUAL CYCLES: ICD-10-CM

## 2021-04-22 DIAGNOSIS — N92.0 MENORRHAGIA WITH REGULAR CYCLE: Primary | ICD-10-CM

## 2021-04-22 DIAGNOSIS — I10 CHRONIC HYPERTENSION: ICD-10-CM

## 2021-04-22 PROCEDURE — 99204 OFFICE O/P NEW MOD 45 MIN: CPT | Performed by: OBSTETRICS & GYNECOLOGY

## 2021-04-22 RX ORDER — MEDROXYPROGESTERONE ACETATE 10 MG/1
10 TABLET ORAL DAILY
Qty: 10 TAB | Refills: 0 | Status: SHIPPED | OUTPATIENT
Start: 2021-04-22 | End: 2021-05-02

## 2021-04-22 RX ORDER — MEDROXYPROGESTERONE AC, MICRO 100 %
POWDER (GRAM) MISCELLANEOUS
COMMUNITY
Start: 2020-11-16 | End: 2021-04-22

## 2021-04-22 RX ORDER — HYDROCHLOROTHIAZIDE 12.5 MG/1
12.5 TABLET ORAL DAILY
COMMUNITY
Start: 2021-02-10

## 2021-04-22 RX ORDER — METFORMIN HYDROCHLORIDE 500 MG/1
500 TABLET ORAL 2 TIMES DAILY
COMMUNITY
Start: 2021-04-21

## 2021-05-19 NOTE — PROGRESS NOTES
AUB.  For SIS today. Has had IUD in past, expelled after first month or so. US shows multiple fibroids, largest ~4cm, probable adenomyosis. SIS shows irregular endometrium, ? sessile polyp    -> recommend hysteroscopy/D&C. Can place Mirena IUD same time. Risks and benefits reviewed including: bleeding; infection; uterine perforation with injury to surrounding structures including bowel, bladder, ureters, muscles, vessels, nerves, possibly requiring additional surgery to repair or remove; persistence or recurrence of symptoms; need for additional surgery or treatment depending on results. Questions answered. [facility]  OFFICE PROCEDURE PROGRESS NOTE    Chart reviewed for the following:   IPaloma RN, have reviewed the History, Physical and updated the Allergic reactions for [address] performed immediately prior to start of procedure:   Sai Ny RN, have performed the following reviews on [facility] prior to the start of the procedure:            * Patient was identified by name and date of birth   * Agreement on procedure being performed was verified  * Risks and Benefits explained to the patient  * Procedure site verified and marked as necessary  * Patient was positioned for comfort  * Consent was signed and verified     Time: 1130  Date of procedure: 5/19/2021  Procedure performed by:  Desire Landaverde MD  Provider assisted by: BRADEN Bright RN  Patient assisted by: self  How tolerated by patient: tolerated the procedure well with no complications  Post Procedural Pain Scale: 2 - Hurts Little Bit  Comments: none    SONOHYSTEROGRAPHY    [facility] is a No obstetric history on file. ,  48 y.o. female   OTHER whose No LMP recorded. was on . , presents for a sonohysterography. The indications for this procedure were reviewed with the patient. The procedure was explained in detail and all questions were answered. Procedure:   The patient was placed in the lithotomy position. A graves speculum was introduced into the vagina and the cervix was visualized. The cervix was prepped with iodine solution. A Samfind's Hysterography catheter was then introduced into the uterine cavity and the speculum was removed. Sterile sonohysterography with 3D Reconstruction was performed. The endometrial cavity was distended with sterile saline. The findings are as follows: irregular cavity with ?sessile polyps. The patient tolerated the procedure well without complication, and was discharged to home. TRANSVAGINAL ULTRASOUND PERFORMED  UTERUS IS ANTEVERTED, ENLARGED IN SIZE AND HETEROGENEOUS IN ECHOGENICITY. THERE APPEARS TO BE FIBROIDS, MOST PROMINENT ARE MEASURED. FIB 1- ML INTRAMURAL. FIB 2- RT  POSTERIOR INTRAMURAL. FIB 3- ANTERIOR INTRAMURAL.  ENDOMETRIUM MEASURES 5-6MM IN THICKNESS. THE ENDOMETRIUM APPEARS HETEROGENEOUS WITH  IRREGULAR BORDERS. THE SIS WAS COMPLETED. FOLLOWING INSERTION OF THE CATHETER INTO THE UTERUS, AND INJECTION OF  SALINE THE ENDOMETRIAL CAVITY DISTENDED. BILATERAL OVARIES ARE NOT SEEN DUE TO BOWEL GAS. THE RIGHT AND LEFT ADNEXAS APPEAR WNL. NO FREE FLUID SEEN IN THE CDS.     #1=3.3cm  #2=3.17cm  #3=4cm

## 2021-05-20 ENCOUNTER — DOCUMENTATION ONLY (OUTPATIENT)
Dept: OBGYN CLINIC | Age: 46
End: 2021-05-20

## 2021-05-20 ENCOUNTER — OFFICE VISIT (OUTPATIENT)
Dept: OBGYN CLINIC | Age: 46
End: 2021-05-20

## 2021-05-20 DIAGNOSIS — D21.9 FIBROIDS: ICD-10-CM

## 2021-05-20 DIAGNOSIS — N93.9 ABNORMAL UTERINE BLEEDING (AUB): Primary | ICD-10-CM

## 2021-05-20 DIAGNOSIS — R93.5 ABNORMAL ENDOMETRIAL ULTRASOUND: ICD-10-CM

## 2021-05-20 PROCEDURE — 99212 OFFICE O/P EST SF 10 MIN: CPT | Performed by: OBSTETRICS & GYNECOLOGY

## 2021-05-20 NOTE — PROGRESS NOTES
Records reviewed from [facility].    - Visit 6/2020  - +COVID (2/10/21)  - h/o papillary thyroid cancer (2005) treated with thyroidectomy, followed by ablation x2  - non-alcoholic fatty liver, ^LFTs  - h/o atypical migraine  - was tx'd with Provera 20mg daily

## 2021-06-01 DIAGNOSIS — N93.9 ABNORMAL UTERINE BLEEDING: Primary | ICD-10-CM

## 2021-06-25 NOTE — PERIOP NOTES
[address] [facility], [address] [facility]   MAIN OR                                  [phone]   MAIN PRE OP                          [phone]                                                                                AMBULATORY PRE OP          [phone]  PRE-ADMISSION TESTING    [phone]   Surgery Date:  6/30/2021 Wednesday      Is surgery arrival time given by surgeon? NO  If NO, [facility] staff will call you between 3 and 7pm the day before your surgery with your arrival time. (If your surgery is on a Monday, we will call you the Friday before.)    Call [phone] after 7pm Monday-Friday if you did not receive this call. INSTRUCTIONS BEFORE YOUR SURGERY   When You  Arrive Arrive at the [address] [facility] on the day of your surgery  Have your insurance card, photo ID, and any copayment (if needed)   Food   and   Drink NO food or drink after midnight the night before surgery    This means NO water, gum, mints, coffee, juice, etc.  No alcohol (beer, wine, liquor) 24 hours before and after surgery   Medications to   TAKE   Morning of Surgery MEDICATIONS TO TAKE THE MORNING OF SURGERY WITH A SIP OF WATER:    Synthroid   Medications  To  STOP      7 days before surgery  Non-Steroidal anti-inflammatory Drugs (NSAID's): for example, Ibuprofen (Advil, Motrin), Naproxen (Aleve)   Aspirin, if taking for pain    Herbal supplements, vitamins, and fish oil   Other:  (Pain medications not listed above, including Tylenol may be taken)   Blood  Thinners  If you take  Aspirin, Plavix, Coumadin, or any blood-thinning or anti-blood clot medicine, talk to the doctor who prescribed the medications for pre-operative instructions.    Bathing Clothing  Jewelry  Valuables      If you shower the morning of surgery, please do not apply anything to your skin (lotions, powders, deodorant, or makeup, especially mascara)   Follow [employer] body wash instructions provided to you during PAT appointment. Begin 3 days prior to surgery.  Do not shave or trim anywhere 24 hours before surgery   Wear your hair loose or down; no pony-tails, buns, or metal hair clips   Wear loose, comfortable, clean clothes   Wear glasses instead of contacts  [employer] money, valuables, and jewelry, including body piercings, at home   If you were given an [employer], bring it on day of surgery. Going Home - or Spending the Night  SAME-DAY SURGERY: You must have a responsible adult drive you home and stay with you 24 hours after surgery   ADMITS: If your doctor is keeping you in the hospital after surgery, leave personal belongings/luggage in your car until you have a hospital room number. Hospital discharge time is 12 noon  Drivers must be here before 12 noon unless you are told differently   Special Instructions        Follow all instructions so your surgery wont be cancelled. Please, be on time. If a situation occurs and you are delayed the day of surgery, call [phone] or [phone]. If your physical condition changes (like a fever, cold, flu, etc.) call your surgeon. Home medication(s) reviewed and verified verbally during PAT appointment. The patient was contacted by phone. The patient verbalizes understanding of all instructions and does not need reinforcement.

## 2021-06-26 ENCOUNTER — HOSPITAL ENCOUNTER (OUTPATIENT)
Dept: PREADMISSION TESTING | Age: 46
Discharge: HOME | End: 2021-06-26
Payer: COMMERCIAL

## 2021-06-26 LAB
SARS-COV-2, COV2: NOT DETECTED
SPECIMEN SOURCE, FCOV2M: NORMAL

## 2021-06-26 PROCEDURE — U0003 INFECTIOUS AGENT DETECTION BY NUCLEIC ACID (DNA OR RNA); SEVERE ACUTE RESPIRATORY SYNDROME CORONAVIRUS 2 (SARS-COV-2) (CORONAVIRUS DISEASE [COVID-19]), AMPLIFIED PROBE TECHNIQUE, MAKING USE OF HIGH THROUGHPUT TECHNOLOGIES AS DESCRIBED BY CMS-2020-01-R: HCPCS

## 2021-06-29 ENCOUNTER — ANESTHESIA EVENT (OUTPATIENT)
Dept: SURGERY | Age: 46
End: 2021-06-29
Payer: COMMERCIAL

## 2021-06-29 NOTE — H&P
Gynecology History and Physical    Name: Anisha Batista MRN: [id number] SSN: [id number]    YOB: 1975  Age: 48 y.o. Sex: female       Subjective:      Chief complaint:  Abnormal uterine bleeding    Brooke Rome is a 48 y.o.  female with a history of abnormal uterine bleeding. Previous workup included a sonohysterogram which ?sessile polyp(s), small fibroids x2  2-3cm, ?adenomyoma ~4cm. . Previous treatment measures included provera. She is admitted for Procedure(s) (LRB):  HYSTEROSCOPY DILATION AND CURETTAGE, POLYPECTOMY WITH MYOSURE/MIRENA IUD INSERTION (N/A). OB History    No obstetric history on file. Past Medical History:   Diagnosis Date    Adverse effect of anesthesia     \"Sleep Apnea\"    Atypical migraine     COVID-19 02/10/2021    Hypertension     Non-alcoholic fatty liver disease     Obesity, Class II, BMI 35-39.9     Pre-diabetes     Thyroid cancer ([facility] [address] 2005    Partial thyroidectomy to total thyroidectomy; also treated with iodine x2     Past Surgical History:   Procedure Laterality Date    HX LAP CHOLECYSTECTOMY N/A 07/31/2018    HX THYROIDECTOMY Bilateral 1/2005; 2/2005    Partial thyroidectomy 1/2005; Total thyroidectomy 2/2005     Social History     Occupational History    Not on file   Tobacco Use    Smoking status: Never Smoker    Smokeless tobacco: Never Used   Vaping Use    Vaping Use: Never used   Substance and Sexual Activity    Alcohol use: Yes     Alcohol/week: 0.0 - 1.0 standard drinks    Drug use: No    Sexual activity: Not on file     History reviewed. No pertinent family history. No Known Allergies  Prior to Admission medications    Medication Sig Start Date End Date Taking? Authorizing Provider   calcium citrate/vitamin D3 (CITRACAL + D PO) Take 1 Tablet by mouth three (3) times daily. Yes Provider, Historical   hydroCHLOROthiazide (HYDRODIURIL) 12.5 mg tablet Take 12.5 mg by mouth daily.  2/10/21  Yes Provider, Historical   metFORMIN (GLUCOPHAGE) 500 mg tablet Take 500 mg by mouth two (2) times a day. 4/21/21  Yes Provider, Historical   levothyroxine (SYNTHROID) 175 mcg tablet Take 175 mcg by mouth Daily (before breakfast). Yes Provider, Historical   ferrous sulfate (IRON) 325 mg (65 mg iron) EC tablet Take 325 mg by mouth Daily (before breakfast). Yes Provider, Historical   fish,bora,flax oils-om3,6,9no1 (OMEGA 3-6-9) 1,200 mg cap Take 1 Cap by mouth three (3) times daily. Yes Provider, Historical   cholecalciferol (VITAMIN D3) 1,000 unit tablet Take 1,000 Units by mouth daily. Yes Provider, Historical   co-enzyme Q-10 (CO Q-10) 100 mg capsule Take 100 mg by mouth daily. Yes Provider, Historical   vitamin E (AQUA GEMS) 400 unit capsule Take 400 Units by mouth two (2) times a day. Yes Provider, Historical   ascorbic acid, vitamin C, (VITAMIN C) 500 mg tablet Take 500 mg by mouth three (3) times daily. Yes Provider, Historical        Review of Systems:  A comprehensive review of systems was negative except for that written in the History of Present Illness. Objective:     Vitals:    06/25/21 1510   Weight: 190 lb (86.2 kg)   Height: 4' 11\" (1.499 m)       Physical Exam:  Patient without distress.   Heart: Regular rate and rhythm or S1S2 present  Lung: clear to auscultation throughout lung fields, no wheezes, no rales, no rhonchi and normal respiratory effort  Abdomen: soft, nontender  External Genitalia: normal general appearance  Urinary system: urethral meatus normal  Vagina: normal mucosa without prolapse or lesions  Cervix: normal appearance  Adnexa: normal bimanual exam  Uterus: upper nl/bulky    Assessment:     Abnormal uterine bleeding with ?sessile polyp, small fibroids, ~adenomyoma    Plan:     Procedure(s) (LRB):  HYSTEROSCOPY DILATION AND CURETTAGE, POLYPECTOMY WITH MYOSURE/MIRENA IUD INSERTION (N/A)  Discussed the risks of surgery including the risks of bleeding, infection, deep vein thrombosis, and surgical injuries to internal organs including but not limited to the bowel, bladder, ureters, rectum, female reproductive organs, blood vessels, muscles, nerves, possibly requiring additional surgery to repair or remove. Additional evaluation and/or treatment may be necessary depending on final findings or pathology results. Symptoms may persist or recur. The patient understands the risks; any and all questions were answered to the patient's satisfaction.     Signed By:  Marian Cruz MD     June 29, 2021

## 2021-06-30 ENCOUNTER — HOSPITAL ENCOUNTER (OUTPATIENT)
Age: 46
Setting detail: OUTPATIENT SURGERY
Discharge: HOME | End: 2021-06-30
Attending: OBSTETRICS & GYNECOLOGY | Admitting: OBSTETRICS & GYNECOLOGY
Payer: COMMERCIAL

## 2021-06-30 ENCOUNTER — ANESTHESIA (OUTPATIENT)
Dept: SURGERY | Age: 46
End: 2021-06-30
Payer: COMMERCIAL

## 2021-06-30 VITALS
DIASTOLIC BLOOD PRESSURE: 70 MMHG | OXYGEN SATURATION: 100 % | TEMPERATURE: 97.9 F | HEIGHT: 59 IN | BODY MASS INDEX: 38.3 KG/M2 | SYSTOLIC BLOOD PRESSURE: 133 MMHG | HEART RATE: 68 BPM | RESPIRATION RATE: 11 BRPM | WEIGHT: 190 LBS

## 2021-06-30 DIAGNOSIS — G89.18 POSTOPERATIVE PAIN: Primary | ICD-10-CM

## 2021-06-30 DIAGNOSIS — N93.9 ABNORMAL UTERINE BLEEDING: ICD-10-CM

## 2021-06-30 LAB
ERYTHROCYTE [DISTWIDTH] IN BLOOD BY AUTOMATED COUNT: 16.5 % (ref 11.5–14.5)
GLUCOSE BLD STRIP.AUTO-MCNC: 120 MG/DL (ref 65–117)
HCG UR QL: NEGATIVE
HCT VFR BLD AUTO: 36.7 % (ref 35–47)
HGB BLD-MCNC: 12.2 G/DL (ref 11.5–16)
MCH RBC QN AUTO: 28 PG (ref 26–34)
MCHC RBC AUTO-ENTMCNC: 33.2 G/DL (ref 30–36.5)
MCV RBC AUTO: 84.4 FL (ref 80–99)
NRBC # BLD: 0 K/UL (ref 0–0.01)
NRBC BLD-RTO: 0 PER 100 WBC
PLATELET # BLD AUTO: 404 K/UL (ref 150–400)
PMV BLD AUTO: 8.5 FL (ref 8.9–12.9)
RBC # BLD AUTO: 4.35 M/UL (ref 3.8–5.2)
SERVICE CMNT-IMP: ABNORMAL
WBC # BLD AUTO: 6.9 K/UL (ref 3.6–11)

## 2021-06-30 PROCEDURE — 74011250636 HC RX REV CODE- 250/636: Performed by: ANESTHESIOLOGY

## 2021-06-30 PROCEDURE — 77030037417 HC DEV TISS RMVL HOLO -H: Performed by: OBSTETRICS & GYNECOLOGY

## 2021-06-30 PROCEDURE — 58561 HYSTEROSCOPY REMOVE MYOMA: CPT | Performed by: OBSTETRICS & GYNECOLOGY

## 2021-06-30 PROCEDURE — 85027 COMPLETE CBC AUTOMATED: CPT

## 2021-06-30 PROCEDURE — 36415 COLL VENOUS BLD VENIPUNCTURE: CPT

## 2021-06-30 PROCEDURE — 81025 URINE PREGNANCY TEST: CPT

## 2021-06-30 PROCEDURE — 77030019905 HC CATH URETH INTMIT MDII -A: Performed by: OBSTETRICS & GYNECOLOGY

## 2021-06-30 PROCEDURE — 74011250637 HC RX REV CODE- 250/637: Performed by: ANESTHESIOLOGY

## 2021-06-30 PROCEDURE — 76210000021 HC REC RM PH II 0.5 TO 1 HR: Performed by: OBSTETRICS & GYNECOLOGY

## 2021-06-30 PROCEDURE — 2709999900 HC NON-CHARGEABLE SUPPLY: Performed by: OBSTETRICS & GYNECOLOGY

## 2021-06-30 PROCEDURE — 76060000032 HC ANESTHESIA 0.5 TO 1 HR: Performed by: OBSTETRICS & GYNECOLOGY

## 2021-06-30 PROCEDURE — 77030041423 HC SYST FLUID MNGMT FLUENT HOLO -D: Performed by: OBSTETRICS & GYNECOLOGY

## 2021-06-30 PROCEDURE — 82962 GLUCOSE BLOOD TEST: CPT

## 2021-06-30 PROCEDURE — 77030020143 HC AIRWY LARYN INTUB CGAS -A: Performed by: ANESTHESIOLOGY

## 2021-06-30 PROCEDURE — 88305 TISSUE EXAM BY PATHOLOGIST: CPT

## 2021-06-30 PROCEDURE — 76010000138 HC OR TIME 0.5 TO 1 HR: Performed by: OBSTETRICS & GYNECOLOGY

## 2021-06-30 PROCEDURE — 77030040361 HC SLV COMPR DVT MDII -B

## 2021-06-30 PROCEDURE — 74011000250 HC RX REV CODE- 250: Performed by: ANESTHESIOLOGY

## 2021-06-30 PROCEDURE — 77030040922 HC BLNKT HYPOTHRM STRY -A

## 2021-06-30 PROCEDURE — 76210000063 HC OR PH I REC FIRST 0.5 HR: Performed by: OBSTETRICS & GYNECOLOGY

## 2021-06-30 PROCEDURE — 58300 INSERT INTRAUTERINE DEVICE: CPT | Performed by: OBSTETRICS & GYNECOLOGY

## 2021-06-30 RX ORDER — SCOLOPAMINE TRANSDERMAL SYSTEM 1 MG/1
1 PATCH, EXTENDED RELEASE TRANSDERMAL
Status: DISCONTINUED | OUTPATIENT
Start: 2021-06-30 | End: 2021-06-30 | Stop reason: HOSPADM

## 2021-06-30 RX ORDER — SODIUM CHLORIDE, SODIUM LACTATE, POTASSIUM CHLORIDE, CALCIUM CHLORIDE 600; 310; 30; 20 MG/100ML; MG/100ML; MG/100ML; MG/100ML
125 INJECTION, SOLUTION INTRAVENOUS CONTINUOUS
Status: DISCONTINUED | OUTPATIENT
Start: 2021-06-30 | End: 2021-06-30 | Stop reason: HOSPADM

## 2021-06-30 RX ORDER — LIDOCAINE HYDROCHLORIDE 20 MG/ML
INJECTION, SOLUTION EPIDURAL; INFILTRATION; INTRACAUDAL; PERINEURAL AS NEEDED
Status: DISCONTINUED | OUTPATIENT
Start: 2021-06-30 | End: 2021-06-30 | Stop reason: HOSPADM

## 2021-06-30 RX ORDER — HYDROCODONE BITARTRATE AND ACETAMINOPHEN 5; 325 MG/1; MG/1
1 TABLET ORAL
Qty: 5 TABLET | Refills: 0 | Status: SHIPPED | OUTPATIENT
Start: 2021-06-30 | End: 2021-07-03

## 2021-06-30 RX ORDER — PROPOFOL 10 MG/ML
INJECTION, EMULSION INTRAVENOUS AS NEEDED
Status: DISCONTINUED | OUTPATIENT
Start: 2021-06-30 | End: 2021-06-30 | Stop reason: HOSPADM

## 2021-06-30 RX ORDER — FENTANYL CITRATE 50 UG/ML
INJECTION, SOLUTION INTRAMUSCULAR; INTRAVENOUS AS NEEDED
Status: DISCONTINUED | OUTPATIENT
Start: 2021-06-30 | End: 2021-06-30 | Stop reason: HOSPADM

## 2021-06-30 RX ORDER — DEXAMETHASONE SODIUM PHOSPHATE 4 MG/ML
INJECTION, SOLUTION INTRA-ARTICULAR; INTRALESIONAL; INTRAMUSCULAR; INTRAVENOUS; SOFT TISSUE AS NEEDED
Status: DISCONTINUED | OUTPATIENT
Start: 2021-06-30 | End: 2021-06-30 | Stop reason: HOSPADM

## 2021-06-30 RX ORDER — LIDOCAINE HYDROCHLORIDE 10 MG/ML
0.1 INJECTION, SOLUTION EPIDURAL; INFILTRATION; INTRACAUDAL; PERINEURAL AS NEEDED
Status: DISCONTINUED | OUTPATIENT
Start: 2021-06-30 | End: 2021-06-30 | Stop reason: HOSPADM

## 2021-06-30 RX ORDER — FLUMAZENIL 0.1 MG/ML
0.2 INJECTION INTRAVENOUS
Status: DISCONTINUED | OUTPATIENT
Start: 2021-06-30 | End: 2021-06-30 | Stop reason: HOSPADM

## 2021-06-30 RX ORDER — ONDANSETRON 2 MG/ML
INJECTION INTRAMUSCULAR; INTRAVENOUS AS NEEDED
Status: DISCONTINUED | OUTPATIENT
Start: 2021-06-30 | End: 2021-06-30 | Stop reason: HOSPADM

## 2021-06-30 RX ORDER — KETOROLAC TROMETHAMINE 30 MG/ML
INJECTION, SOLUTION INTRAMUSCULAR; INTRAVENOUS AS NEEDED
Status: DISCONTINUED | OUTPATIENT
Start: 2021-06-30 | End: 2021-06-30 | Stop reason: HOSPADM

## 2021-06-30 RX ORDER — NALOXONE HYDROCHLORIDE 0.4 MG/ML
0.2 INJECTION, SOLUTION INTRAMUSCULAR; INTRAVENOUS; SUBCUTANEOUS
Status: DISCONTINUED | OUTPATIENT
Start: 2021-06-30 | End: 2021-06-30 | Stop reason: HOSPADM

## 2021-06-30 RX ORDER — HYDROMORPHONE HYDROCHLORIDE 1 MG/ML
.25-1 INJECTION, SOLUTION INTRAMUSCULAR; INTRAVENOUS; SUBCUTANEOUS
Status: DISCONTINUED | OUTPATIENT
Start: 2021-06-30 | End: 2021-06-30 | Stop reason: HOSPADM

## 2021-06-30 RX ORDER — MIDAZOLAM HYDROCHLORIDE 1 MG/ML
INJECTION, SOLUTION INTRAMUSCULAR; INTRAVENOUS AS NEEDED
Status: DISCONTINUED | OUTPATIENT
Start: 2021-06-30 | End: 2021-06-30 | Stop reason: HOSPADM

## 2021-06-30 RX ORDER — DIPHENHYDRAMINE HYDROCHLORIDE 50 MG/ML
12.5 INJECTION, SOLUTION INTRAMUSCULAR; INTRAVENOUS AS NEEDED
Status: DISCONTINUED | OUTPATIENT
Start: 2021-06-30 | End: 2021-06-30 | Stop reason: HOSPADM

## 2021-06-30 RX ADMIN — ONDANSETRON HYDROCHLORIDE 4 MG: 2 SOLUTION INTRAMUSCULAR; INTRAVENOUS at 07:36

## 2021-06-30 RX ADMIN — FENTANYL CITRATE 25 MCG: 50 INJECTION, SOLUTION INTRAMUSCULAR; INTRAVENOUS at 07:52

## 2021-06-30 RX ADMIN — FENTANYL CITRATE 25 MCG: 50 INJECTION, SOLUTION INTRAMUSCULAR; INTRAVENOUS at 07:33

## 2021-06-30 RX ADMIN — MIDAZOLAM 2 MG: 1 INJECTION, SOLUTION INTRAMUSCULAR; INTRAVENOUS at 07:23

## 2021-06-30 RX ADMIN — FENTANYL CITRATE 25 MCG: 50 INJECTION, SOLUTION INTRAMUSCULAR; INTRAVENOUS at 07:36

## 2021-06-30 RX ADMIN — KETOROLAC TROMETHAMINE 30 MG: 30 INJECTION INTRAMUSCULAR; INTRAVENOUS at 08:09

## 2021-06-30 RX ADMIN — SODIUM CHLORIDE, POTASSIUM CHLORIDE, SODIUM LACTATE AND CALCIUM CHLORIDE 125 ML/HR: 600; 310; 30; 20 INJECTION, SOLUTION INTRAVENOUS at 06:41

## 2021-06-30 RX ADMIN — LIDOCAINE HYDROCHLORIDE 100 MG: 20 INJECTION, SOLUTION EPIDURAL; INFILTRATION; INTRACAUDAL; PERINEURAL at 07:29

## 2021-06-30 RX ADMIN — PROPOFOL 200 MG: 10 INJECTION, EMULSION INTRAVENOUS at 07:29

## 2021-06-30 RX ADMIN — DEXAMETHASONE SODIUM PHOSPHATE 4 MG: 4 INJECTION, SOLUTION INTRAMUSCULAR; INTRAVENOUS at 07:40

## 2021-06-30 RX ADMIN — FENTANYL CITRATE 25 MCG: 50 INJECTION, SOLUTION INTRAMUSCULAR; INTRAVENOUS at 08:02

## 2021-06-30 NOTE — ADDENDUM NOTE
Addendum  created 06/30/21 1152 by Claven Scheuermann, MD    Attestation recorded in Intraprocedure, Clinical Note Signed, [address] filed

## 2021-06-30 NOTE — DISCHARGE INSTRUCTIONS
POSTOPERATIVE INSTRUCTIONS  FOR D&C, HYSTEROSCOPY      A D&C is a minor procedure. Your recovery from each one of these procedures should be relatively quick and uneventful. There are a few points that we ask you to remember:       1. Absolutely no intercourse, douching or tampon use until you are seen for your postop check. 2. You can expect to have some vaginal bleeding or bloody vaginal discharge for the    next 2 to 4 weeks. 3. You may take tub baths after two weeks and showers at any time. 4. You may resume normal everyday activities as you feel able and return to work    within 2-3 days after surgery. 5. Notify us if you experience:     a.  heavy vaginal bleeding or foul vaginal discharge    b.  temperature of 101° or greater    c.  severe pelvic or vaginal pain      6. Please call the office today at [phone] to schedule your checkup appointment    In 4 weeks with ultrasound for IUD check if you have not already been contacted. Above all, please notify us of a problem if it arises before we see you again. In an emergency, you may contact a doctor 24 hours a day at [phone]. [address] [facility]                                      [address] [facility], [address] [place] [address]                    Phone [phone]                        Fax [phone]      DISCHARGE SUMMARY from your Nurse      PATIENT INSTRUCTIONS    After general anesthesia or intravenous sedation, for 24 hours or while taking prescription Narcotics:  · Limit your activities  · Do not drive and operate hazardous machinery  · Do not make important personal or business decisions  · Do  not drink alcoholic beverages  · If you have not urinated within 8 hours after discharge, please contact your surgeon on call.     Report the following to your surgeon:  · Excessive pain, swelling, redness or odor of or around the surgical area  · Temperature over 100.5  · Nausea and vomiting lasting longer than 4 hours or if unable to take medications  · Any signs of decreased circulation or nerve impairment to extremity: change in color, persistent  numbness, tingling, coldness or increase pain  · Any questions      GOOD HELP TO FIGHT AN INFECTION  Here are a few tip to help reduce the chance of getting an infection after surgery:   Wash Your Hands   Good handwashing is the most important thing you and your caregiver can do.  Wash before and after caring for any wounds. Dry your hand with a clean towel.  Wash with soap and water for at least 20 seconds. A TIP: sing the \"Happy Birthday\" song through one time while washing to help with the timing.  Use a hand  in between washings.  Shower   When your surgeon says it is OK to take a shower, use a new bar of antibacterial soap (if that is what you use, and keep that bar of soap ONLY for your use), or antibacterial body wash.  Use a clean wash cloth or sponge when you bathe.  Dry off with a clean towel  after every bath - be careful around any wounds, skin staples, sutures or surgical glue over/on wounds.  Do not enter swimming pools, hot tubs, lakes, rivers and/or ocean until wounds are healed and your doctor/surgeon says it is OK.  Use Clean Sheets   Sleep on freshly laundered sheets after your surgery.  Keep the surgery site covered with a clean, dry bandage (if instructed to do so). If the bandage becomes soiled, reapply a new, dry, clean bandage.  Do not allow pets to sleep with you while your wound is healing.  Lifestyle Modification and Controlling Your Blood Sugar   Smoking slows wound healing. Stop smoking and limit exposure to second-hand smoke.  High blood sugar slows wound healing.   Eat a well-balanced diet to provide proper nutrition while healing   Monitor your blood sugar (if you are a diabetic) and take your medications as you are suppose to so you can control you blood sugar after surgery. COUGH AND DEEP BREATHE    Breathing deeply and coughing are very important exercises to do after surgery. Deep breathing and coughing open the little air tubes and air sacks in your lungs. You take deep breaths every day. You may not even notice - it is just something you do when you sigh or yawn. It is a natural exercise you do to keep these air passages open. After surgery, take deep breaths and cough, on purpose. DIRECTIONS:  · Take 10 to 15 slow deep breaths every hour while awake. · Breathe in deeply, and hold it for 2 seconds. · Exhale slowly through puckered lips, like blowing up a balloon. · After every 4th or 5th deep breath, hug your pillow to your chest or belly and give a hard, deep cough. Yes, it will probably hurt. But doing this exercise is a very important part of healing after surgery. Take your pain medicine to help you do this exercise without too much pain. Coughing and deep breathing help prevent bronchitis and pneumonia after surgery. If you had chest or belly surgery, use a pillow as a \"hug arthur\" and hold it tightly to your chest or belly when you cough. ANKLE PUMPS    Ankle pumps increase the circulation of oxygenated blood to your lower extremities and decrease your risk for circulation problems such as blood clots. They also stretch the muscles, tendons and ligaments in your foot and ankle, and prevent joint contracture in the ankle and foot, especially after surgeries on the legs. It is important to do ankle pump exercises regularly after surgery because immobility increases your risk for developing a blood clot. Your doctor may also have you take an Aspirin for the next few days as well. If your doctor did not ask you to take an Aspirin, consult with him before starting Aspirin therapy on your own. The exercise is quite simple.      · Slowly point your foot forward, feeling the muscles on the top of your lower leg stretch, and hold this position for 5 seconds. · Next, pull your foot back toward you as far as possible, stretching the calf muscles, and hold that position for 5 seconds. · Repeat with the other foot. · Perform 10 repetitions every hour while awake for both ankles if possible (down and then up with the foot once is one repetition). You should feel gentle stretching of the muscles in your lower leg when doing this exercise. If you feel pain, or your range of motion is limited, don't push too hard. Only go the limit your joint and muscles will let you go. If you have increasing pain, progressively worsening leg warmth or swelling, STOP the exercise and call your doctor. MEDICATION AND   SIDE EFFECT GUIDE    The [facility] MEDICATION AND SIDE EFFECT GUIDE was provided to the PATIENT AND CARE PROVIDER. Information provided includes instruction about drug purpose and common side effects for the following medications:   · Norco        These are general instructions for a healthy lifestyle:    *   Please give a list of your current medications to your Primary Care Provider. *   Please update this list whenever your medications are discontinued, doses are changed, or new medications (including over-the-counter products) are added. *   Please carry medication information at all times in case of emergency situations. About Smoking  No smoking / No tobacco products  Avoid exposure to second hand smoke     Surgeon General's Warning:  Quitting smoking now greatly reduces serious risk to your health. Obesity, smoking, and sedentary lifestyle greatly increases your risk for illness and disease. A healthy diet, regular physical exercise & weight monitoring are important for maintaining a healthy lifestyle.       Congestive Heart Failure  You may be retaining fluid if you have a history of heart failure or if you experience any of the following symptoms:  Weight gain of 3 pounds or more overnight or 5 pounds in a week, increased swelling in your hands or feet or shortness of breath while lying flat in bed. Please call your doctor as soon as you notice any of these symptoms; do not wait until your next office visit. Recognize signs and symptoms of STROKE:  F -  Face looks uneven  A -  Arms unable to move or move evenly  S -  Speech slurred or non-existent  T -  Time-call 911 as soon as signs and symptoms begin-DO NOT go          back to bed or wait to see if you get better-TIME IS BRAIN. Warning Signs of HEART ATTACK   Call 911 if you have these symptoms:     Chest discomfort. Most heart attacks involve discomfort in the center of the chest that lasts more than a few minutes, or that goes away and comes back. It can feel like uncomfortable pressure, squeezing, fullness, or pain.  Discomfort in other areas of the upper body. Symptoms can include pain or discomfort in one or both arms, the back, neck, jaw, or stomach.  Shortness of breath with or without chest discomfort.  Other signs may include breaking out in a cold sweat, nausea, or lightheadedness. Don't wait more than five minutes to call 911 - MINUTES MATTER! Fast action can save your life. Calling 911 is almost always the fastest way to get lifesaving treatment. Emergency Medical Services staff can begin treatment when they arrive -- up to an hour sooner than if someone gets to the hospital by car. Learning About Coronavirus [phone])  Coronavirus [phone]): Overview  What is coronavirus (COVID-19)? The coronavirus disease (COVID-19) is caused by a virus. It is an illness that was first found in [place], [place], in December 2019. It has since spread worldwide. The virus can cause fever, cough, and trouble breathing. In severe cases, it can cause pneumonia and make it hard to breathe without help. It can cause death.   Coronaviruses are a large group of viruses. They cause the common cold. They also cause more serious illnesses like Middle East respiratory syndrome (MERS) and severe acute respiratory syndrome (SARS). COVID-19 is caused by a novel coronavirus. That means it's a new type that has not been seen in people before. This virus spreads person-to-person through droplets from coughing and sneezing. It can also spread when you are close to someone who is infected. And it can spread when you touch something that has the virus on it, such as a doorknob or a tabletop. What can you do to protect yourself from coronavirus (COVID-19)? The best way to protect yourself from getting sick is to:  · Avoid areas where there is an outbreak. · Avoid contact with people who may be infected. · Wash your hands often with soap or alcohol-based hand sanitizers. · Avoid crowds and try to stay at least 6 feet away from other people. · Wash your hands often, especially after you cough or sneeze. Use soap and water, and scrub for at least 20 seconds. If soap and water aren't available, use an alcohol-based hand . · Avoid touching your mouth, nose, and eyes. What can you do to avoid spreading the virus to others? To help avoid spreading the virus to others:  · Cover your mouth with a tissue when you cough or sneeze. Then throw the tissue in the trash. · Use a disinfectant to clean things that you touch often. · Stay home if you are sick or have been exposed to the virus. Don't go to school, work, or public areas. And don't use public transportation. · If you are sick:  ? Leave your home only if you need to get medical care. But call the doctor's office first so they know you're coming. And wear a face mask, if you have one.  ? If you have a face mask, wear it whenever you're around other people. It can help stop the spread of the virus when you cough or sneeze. ? Clean and disinfect your home every day.  Use household  and disinfectant wipes or sprays. Take special care to clean things that you grab with your hands. These include doorknobs, remote controls, phones, and handles on your refrigerator and microwave. And don't forget countertops, tabletops, bathrooms, and computer keyboards. When to call for help  Call 911 anytime you think you may need emergency care. For example, call if:  · You have severe trouble breathing. (You can't talk at all.)  · You have constant chest pain or pressure. · You are severely dizzy or lightheaded. · You are confused or can't think clearly. · Your face and lips have a blue color. · You pass out (lose consciousness) or are very hard to wake up. Call your doctor now if you develop symptoms such as:  · Shortness of breath. · Fever. · Cough. If you need to get care, call ahead to the doctor's office for instructions before you go. Make sure you wear a face mask, if you have one, to prevent exposing other people to the virus. Where can you get the latest information? The following health organizations are tracking and studying this virus. Their websites contain the most up-to-date information. Aly Trevino also learn what to do if you think you may have been exposed to the virus. · U.S. Centers for Disease Control and Prevention (CDC): The CDC provides updated news about the disease and travel advice. The website also tells you how to prevent the spread of infection. www.cdc.gov  · World Health Organization [facility]): WHO offers information about the virus outbreaks. WHO also has travel advice. www.who.int  Current as of: April 1, 2020               Content Version: 12.4  © 1796-8821 [employer]. Care instructions adapted under license by your healthcare professional. If you have questions about a medical condition or this instruction, always ask your healthcare professional. [address] any warranty or liability for your use of this information.     The discharge information has been reviewed with the {PATIENT PARENT GUARDIAN:79282}. Any questions and concerns from the {PATIENT PARENT GUARDIAN:84483} have been addressed. The {PATIENT PARENT GUARDIAN:72748} verbalized understanding. CONTENTS FOUND IN YOUR DISCHARGE ENVELOPE:  [x]     Surgeon and Hospital Discharge Instructions  [x]     [facility] Care Provider Card  [x]     Medication & Side Effect Guide            (your newly prescribed medications have been marked/highlighted showing the most common side effects from   the classes of drugs on your prescriptions)  [x]     Medication Prescription(s) x *** ( [x] These have been sent electronically to your pharmacy by your surgeon,   - OR -       your surgeon has already provided these to you during a previous/pre-op office visit)  []     [address]  []     Physical Therapy Prescription  []     Follow-up Appointment Cards  []     Surgery-related Pictures/Media  []     Pain block and/or block with On-Q Catheter from Anesthesia Service (information included in your instructions above)  []     Medical device information sheets/pamphlets from their    []     School/work excuse note. []     /parent work excuse note. The following personal items collected during your admission are returned to you:   Dental Appliance: Dental Appliances: None  Vision: Visual Aid: Glasses  Hearing Aid:    Jewelry: Jewelry: None  Clothing: Clothing: Other (comment) (street clothes to locker)  Other Valuables:  Other Valuables: Eyeglasses (in belongings bag)  Valuables sent to safe: Personal Items Sent to Safe: none

## 2021-06-30 NOTE — ANESTHESIA POSTPROCEDURE EVALUATION
Procedure(s): HYSTEROSCOPY DILATION AND CURETTAGE, POLYPECTOMY WITH MYOSURE/MIRENA IUD INSERTION.     general    Anesthesia Post Evaluation      Multimodal analgesia: multimodal analgesia used between 6 hours prior to anesthesia start to PACU discharge  Patient location during evaluation: PACU  Patient participation: complete - patient participated  Level of consciousness: awake and alert  Pain management: adequate  Airway patency: patent  Anesthetic complications: no  Cardiovascular status: acceptable  Respiratory status: acceptable  Hydration status: acceptable  Post anesthesia nausea and vomiting:  none  Final Post Anesthesia Temperature Assessment:  Normothermia (36.0-37.5 degrees C)      INITIAL Post-op Vital signs:   Vitals Value Taken Time   /74 06/30/21 0850   Temp 36.6 °C (97.9 °F) 06/30/21 0850   Pulse 73 06/30/21 0850   Resp 14 06/30/21 0850   SpO2 100 % 06/30/21 0850

## 2021-06-30 NOTE — BRIEF OP NOTE
Brief Postoperative Note    Patient: Anibal Bowden  YOB: 1975  MRN: [id number]    Date of Procedure: 6/30/2021     Pre-Op Diagnosis: ABNORMAL UTERINE BLEEDING    Post-Op Diagnosis: Same as preoperative diagnosis. Submucosal fibroid. Procedure(s): HYSTEROSCOPY DILATION AND CURETTAGE, MYOMECTOMY WITH [address] IUD INSERTION    Surgeon(s):  Ming Don MD    Surgical Assistant: None    Anesthesia: General     Estimated Blood Loss (mL): Minimal  Hysteroscopy fluid: NS [id number]    Complications: None    Specimens:   ID Type Source Tests Collected by Time Destination   1 : Endometrial Curettings with Fibroid VS Polyp  Preservative Uterus  Ming Don MD 6/30/2021 9052 Pathology        Implants:   Implant Name Type Inv. Item Serial No.  Lot No. LRB No. Used Action   Mirena IUD 52mg    [id number] [employer] [id number] N/A 1 Implanted       Drains: * No LDAs found *    Findings: nl tubal ostia. Submucosal fibroid arising from the left anterolateral wall.   Uterus sounds to 10cm    Electronically Signed by Trisha Castellanos MD on 6/30/2021 at 8:13 AM

## 2021-06-30 NOTE — ANESTHESIA PREPROCEDURE EVALUATION
Anesthetic History   No history of anesthetic complications            Review of Systems / Medical History  Patient summary reviewed and pertinent labs reviewed    Pulmonary  Within defined limits                 Neuro/Psych   Within defined limits           Cardiovascular    Hypertension              Exercise tolerance: >4 METS     GI/Hepatic/Renal           Liver disease (fatty liver, improved)     Endo/Other      Hypothyroidism: well controlled  Morbid obesity, cancer (h/o thyroid cancer) and anemia  Pertinent negatives: Diabetes: pre-diabetic    Other Findings              Physical Exam    Airway  Mallampati: III  TM Distance: > 6 cm  Neck ROM: normal range of motion   Mouth opening: Normal     Cardiovascular    Rhythm: regular  Rate: normal         Dental    Dentition: Upper dentition intact and Lower dentition intact     Pulmonary  Breath sounds clear to auscultation               Abdominal         Other Findings            Anesthetic Plan    ASA: 2  Anesthesia type: general          Induction: Intravenous  Anesthetic plan and risks discussed with: Patient

## 2021-07-01 NOTE — OP NOTES
[facility] 77  OPERATIVE REPORT    Name:  Jamaal Good  MR#:  [id number]  :  1975  ACCOUNT #:  [de-identified]  DATE OF SERVICE:  2021    PREOPERATIVE DIAGNOSIS:  Abnormal uterine bleeding. POSTOPERATIVE DIAGNOSIS:  Abnormal uterine bleeding. PROCEDURES PERFORMED:  Hysteroscopy, D&C, myomectomy and insertion of Mirena IUD. SURGEON:  Jordy Dhaliwal MD    ASSISTANT:  None    ANESTHESIA:  General.    COMPLICATIONS:  None. SPECIMENS REMOVED:  Endometrial curetting and submucosal fibroid. IMPLANTS:  Mirena IUD. Lot#:[id number]    ESTIMATED BLOOD LOSS:  Minimal.    HYSTEROSCOPY FLUID:  600 mL of normal saline. FINDINGS:  Normal tubal ostia bilaterally. Submucosal fibroid arising from the left anterolateral wall. DRAINS:  None. COUNTS:  Correct. DISPOSITION:  To recovery room in stable condition. OPERATIVE INDICATION:  This is a 78-year-old female who presented to the office with abnormal uterine bleeding. Saline infusion sonogram done in the office showed some irregularity of the uterine wall, questionable sessile polyp versus fibroid. PROCEDURE:  Risks, benefits and alternatives were discussed with the patient and informed consent was obtained. The patient was identified in the holding area and again in the operating room. General anesthesia was established. She was placed in lithotomy position in [address] [place] [address] and prepped and draped in the usual sterile fashion including in-and-out catheterization of the bladder. A surgical time-out was performed. An exam under anesthesia was performed that demonstrated an anteverted, bulky, mobile uterus. The cervix was visualized with a speculum and the anterior lip grasped with an Allis clamp. The cervix was easily dilated to a #5 Hegar. The hysteroscope was introduced into the endocervical canal and hydrostatic pressure was used to assist with additional dilation.   The hysteroscope was then easily advanced into the uterine cavity with the above findings noted. The MyoSure REACH blade was introduced through the operative port of the hysteroscope. This was used to resect the submucosal fibroid and to curette the endometrial cavity. The inflow was paused for 1-2 minutes to allow the cavity to decompress. The inflow was then resumed and the cavity redistended. Some additional fibroid fragments were noted protruding at the site. These additional fragments were also resected with the MyoSure REACH blade. No additional findings were noted. The hysteroscope was removed and we then proceeded with the IUD insertion. The uterus sounded to 10 cm. The IUD was inserted without difficulty. The strings were trimmed to 3-4 cm. The Allis clamp was removed. All counts were correct. The patient tolerated the procedure well and went to recovery room in stable condition.       Cherry Penaloza MD      [id number]  D:  07/01/2021 7:56  T:  07/01/2021 14:51  JOB #:  [id number]

## 2021-07-12 LAB
CHOLEST SERPL-MCNC: 199 MG/DL
EST. AVERAGE GLUCOSE BLD GHB EST-MCNC: 123 MG/DL
GLUCOSE SERPL-MCNC: 156 MG/DL (ref 65–100)
HBA1C MFR BLD: 5.9 % (ref 4–5.6)
HDLC SERPL-MCNC: 38 MG/DL
LDLC SERPL CALC-MCNC: ABNORMAL MG/DL (ref 0–100)
LDLC SERPL DIRECT ASSAY-MCNC: 109 MG/DL (ref 0–100)
TRIGL SERPL-MCNC: 447 MG/DL (ref ?–150)

## 2021-08-05 ENCOUNTER — OFFICE VISIT (OUTPATIENT)
Dept: OBGYN CLINIC | Age: 46
End: 2021-08-05

## 2021-08-05 VITALS
HEART RATE: 81 BPM | SYSTOLIC BLOOD PRESSURE: 149 MMHG | WEIGHT: 195 LBS | BODY MASS INDEX: 39.39 KG/M2 | DIASTOLIC BLOOD PRESSURE: 79 MMHG

## 2021-08-05 DIAGNOSIS — Z30.431 SURVEILLANCE OF (INTRAUTERINE) CONTRACEPTIVE DEVICE: ICD-10-CM

## 2021-08-05 DIAGNOSIS — Z09 POSTOP CHECK: Primary | ICD-10-CM

## 2021-08-05 PROCEDURE — 99213 OFFICE O/P EST LOW 20 MIN: CPT | Performed by: OBSTETRICS & GYNECOLOGY

## 2021-08-05 NOTE — PROGRESS NOTES
Postop Evaluation  Rebecca Hart is a 48 y.o. female returns for a routine post-operative follow-up visit after undergoing the following: Hysteroscopy, D&C, myomectomy and insertion of Mirena IUD which was done 5 weeks ago. Her pathology results revealed   Endometrium, curettage:        Fragments of secretory pattern endometrium.      Fragments of myometrium compatible with submucosal leiomyoma. .  Since the patient's surgery, she has had typical postoperative discomfort but no significant symptoms or problems since the surgery. Had IUD placed at same time. Having some spotting/light bleeding. US today. Limited visualization of IUD (has shadowing by fibroids, adenomyosis). IUD intrauterine, appears to be in proper position but limited views. TV AND TA ULTRASOUND PERFORMED  UTERUS IS ANTEVERTED, ENLARGED IN SIZE AND HETEROGENOUS IN ECHOGENICITY. THE ANTERIOR UTERINE WALL APPEARS BULKY. THERE MAY BE FIBROIDS PRESENT BUT THEY ARE NOT WELL DEFINED.  ENDOMETRIUM MEASURES 9-10MM IN THICKNESS. NO EVIDENCE OF MASSES OR ABNORMALITIES ARE SEEN. IUD IS SEEN IN THE ENDOMETRIUM BUT IS DIFFICULT TO SEE POSITION WITHIN. RIGHT OVARY APPEARS WITHIN NORMAL LIMITS. A FOLLICULAR CYST IS SEEN. LEFT ADNEXA APPEARS WITHIN NORMAL LIMITS. NO FREE FLUID SEEN IN THE CDS.       PHYSICAL EXAMINATION    Visit Vitals  BP (!) 149/79   Pulse 81   Wt 195 lb (88.5 kg)   BMI 39.39 kg/m²         Gastrointestinal  · Abdominal Examination: abdomen non-tender to palpation, incision/s healing well, normal bowel sounds, no masses present  · Liver and spleen: no hepatomegaly present, spleen not palpable  · Hernias: no hernias identified    Genitourinary  · External Genitalia: normal appearance for age, no discharge present, no tenderness present, no inflammatory lesions present, no masses present, no atrophy present  · Vagina: normal vaginal vault without central or paravaginal defects, no discharge present, no inflammatory lesions present, no masses present  · Bladder: non-tender to palpation  · Urethra: appears normal  · Cervix: normal; IUD strings ~3cm  · Uterus: bulky, ~12wks  · Adnexa: no adnexal tenderness present, no adnexal masses present  · Perineum: perineum within normal limits, no evidence of trauma, no rashes or skin lesions present  Skin  · General Inspection: no rash, no lesions identified    Neurologic/Psychiatric  · Mental Status:  · Orientation: grossly oriented to person, place and time  · Mood and Affect: mood normal, affect appropriate    Assessment:  Normal postop checkup  IUD strings wnl, no sx suggestive of displacement.   Limited visualization on US    Plan:  RTO for AE/mammo

## 2021-10-20 NOTE — PROGRESS NOTES
Airway  Date/Time: 10/20/2021 1:00 PM               S- no new c/o  O - lungs CTAB        CV RRR  A&P - Pt seen and examined. There has been no interval change from H&P. Patient is ready for surgery today as planned.

## 2022-03-19 PROBLEM — Z90.49 S/P LAPAROSCOPIC CHOLECYSTECTOMY: Status: ACTIVE | Noted: 2018-08-10

## 2022-03-19 PROBLEM — E66.01 MORBID OBESITY WITH BMI OF 40.0-44.9, ADULT (HCC): Status: ACTIVE | Noted: 2018-07-31

## 2022-08-05 LAB
CHOLEST SERPL-MCNC: 241 MG/DL
GLUCOSE SERPL-MCNC: 254 MG/DL (ref 65–100)
HDLC SERPL-MCNC: 41 MG/DL
LDLC SERPL CALC-MCNC: ABNORMAL MG/DL (ref 0–100)
LDLC SERPL DIRECT ASSAY-MCNC: 136 MG/DL (ref 0–100)
TRIGL SERPL-MCNC: 646 MG/DL (ref ?–150)

## 2022-08-17 ENCOUNTER — DOCUMENTATION ONLY (OUTPATIENT)
Dept: PHARMACY | Age: 47
End: 2022-08-17

## 2022-08-17 NOTE — PROGRESS NOTES
Pharmacy Pop Care Documentation:   Patient is missing the following requirements: DX: DM Type One or Type Two;  Provider Documentation of DM Visit; A1C. If completed by 8/25/22 patient will be eligible for enrollment in the DM Program on 9/01/22. Application Received: via [employer]. Letter mailed to patient.     Diya Bell, Via [employer] [facility]   Department, toll free: [phone] Option #3

## 2022-08-17 NOTE — LETTER
[facility]  [address], [place] [address]  Phone: toll free [phone] Option #3         Ms. Eric Minimelinda [place] [phone]        Thanks so much for taking the first step towards better health. This letter is to inform you that we have received your enrollment form for the [facility] Well With Diabetes Program, but you are missing the following requirements or documentation:     1. Diagnosis of Diabetes Type One or Two - as per documentation from your Provider  2. Provider Visit for DM within the last 12 months  3. A1C Result within the last 12 months     To qualify for this program the above requirements must be met by 8/25/21 for enrollment into the program on 9/01/22. Results or visits obtained outside of [facility] [place] will need to be provided by fax: [phone] or email: [email or web address] before the deadline in order to qualify for the program.     If requirements are not met by the date listed above, you will be not be enrolled in the program.     [facility]  Phone: toll free [phone] Option #3  Email: [email or web address]  Fax Number: [phone]

## (undated) DEVICE — SET CHOLANGIOGRAPHY 4FR L60CM W/ ARW KARLAN BLLN CATH CRV

## (undated) DEVICE — SPECIMEN RETRIEVAL POUCH: Brand: ENDO CATCH GOLD

## (undated) DEVICE — NEEDLE HYPO 22GA L1.5IN BLK S STL HUB POLYPR SHLD REG BVL

## (undated) DEVICE — PACK,LITHOTOMY,PK I: Brand: MEDLINE

## (undated) DEVICE — DEVICE TISS REMOVAL -- ORDER AS BX     APO CODE = DRP

## (undated) DEVICE — SOL IRRIGATION INJ NACL 0.9% 500ML BTL

## (undated) DEVICE — STERILE POLYISOPRENE POWDER-FREE SURGICAL GLOVES: Brand: PROTEXIS

## (undated) DEVICE — KENDALL SCD EXPRESS SLEEVES, KNEE LENGTH, MEDIUM: Brand: KENDALL SCD

## (undated) DEVICE — GOWN,SIRUS,NONRNF,SETINSLV,XL,20/CS: Brand: MEDLINE

## (undated) DEVICE — PAD,NON-ADHERENT,3X8,STERILE,LF,1/PK: Brand: MEDLINE

## (undated) DEVICE — INFECTION CONTROL KIT SYS

## (undated) DEVICE — SUTURE MCRYL SZ 4-0 L27IN ABSRB UD L19MM PS-2 1/2 CIR PRIM Y426H

## (undated) DEVICE — SURGICAL PROCEDURE KIT GEN LAPAROSCOPY LF

## (undated) DEVICE — TUBING INSUFLTN 10FT LUER -- CONVERT TO ITEM 368568

## (undated) DEVICE — Device

## (undated) DEVICE — LAPAROSCOPIC WIRE L-HOOK ELECTRODE COATED: Brand: CLEANCOAT

## (undated) DEVICE — JELLY,LUBE,STERILE,FLIP TOP,TUBE,4-OZ: Brand: MEDLINE

## (undated) DEVICE — COVER LT HNDL PLAS RIG 1 PER PK

## (undated) DEVICE — DRAPE,REIN 53X77,STERILE: Brand: MEDLINE

## (undated) DEVICE — SET SEALS HYSTEROSCOPE DISP -- MYOSURE  EA=10

## (undated) DEVICE — DEVICE TRNSF SPIK STL 2008S] MICROTEK MEDICAL INC]

## (undated) DEVICE — CARTRIDGE CLP LIG HEMLOK GRN --

## (undated) DEVICE — MARKER SURG SKIN UTIL REGULAR/FINE 2 TIP W/ RUL AND 9 LBL

## (undated) DEVICE — CATHETER,URETHRAL,REDRUBBER,STRL,16FR: Brand: MEDLINE

## (undated) DEVICE — 3000CC GUARDIAN II: Brand: GUARDIAN

## (undated) DEVICE — CLICKLINE SCISSORS INSERT: Brand: CLICKLINE

## (undated) DEVICE — BLADELESS OPTICAL TROCAR WITH FIXATION CANNULA: Brand: VERSAPORT

## (undated) DEVICE — (D)STRIP SKN CLSR 0.5X4IN WHT --

## (undated) DEVICE — UNIVERSAL FIXATION CANNULA: Brand: VERSAONE

## (undated) DEVICE — INSTRMT SET WND CLSR SUT PASS --

## (undated) DEVICE — SYRINGE MED 20ML STD CLR PLAS LUERLOCK TIP N CTRL DISP

## (undated) DEVICE — PAD,SANITARY,11 IN,MAXI,N-STRL,IND WRAP: Brand: MEDLINE

## (undated) DEVICE — CATHETER IV 14GA L2IN POLYUR STR ORNG HUB SFTY RADPQ DISP

## (undated) DEVICE — LIGHT HANDLE: Brand: DEVON

## (undated) DEVICE — SHEET,DRAPE,UNDERBUTTOCK,GRAD POUCH,PORT: Brand: MEDLINE

## (undated) DEVICE — FLUID MGMT SYS FLUENT KIT 6/PK

## (undated) DEVICE — SYR 10ML LUER LOK 1/5ML GRAD --

## (undated) DEVICE — TRAY PREP DRY W/ PREM GLV 2 APPL 6 SPNG 2 UNDPD 1 OVERWRAP

## (undated) DEVICE — CLIP APPLIER: Brand: ENDO CLIP II

## (undated) DEVICE — DEVON™ KNEE AND BODY STRAP 60" X 3" (1.5 M X 7.6 CM): Brand: DEVON

## (undated) DEVICE — (D)PREP SKN CHLRAPRP APPL 26ML -- CONVERT TO ITEM 371833

## (undated) DEVICE — TOWEL SURG W17XL27IN STD BLU COT NONFENESTRATED PREWASHED

## (undated) DEVICE — DRAPE XR C ARM 41X74IN LF --

## (undated) DEVICE — GEL US 20GM NONIRRITATING OVERWRAPPED FILE PCH TRNSMIT

## (undated) DEVICE — CLIP LIG ABSRB HEM-LOK LG PUR --

## (undated) DEVICE — BNDG ADHESIVE SHEER 3/4X3 -- CONVERT TO ITEM 357948

## (undated) DEVICE — BLADELESS OPTICAL TROCAR WITH FIXATION CANNULA: Brand: VERSAONE

## (undated) DEVICE — EVAC SMOKE SEECLEAR XCL -- SEE CLEAR

## (undated) DEVICE — SPONGE GZ W4XL4IN COT RADPQ HIGHLY ABSRB

## (undated) DEVICE — REM POLYHESIVE ADULT PATIENT RETURN ELECTRODE: Brand: VALLEYLAB